# Patient Record
Sex: FEMALE | Race: WHITE | NOT HISPANIC OR LATINO | Employment: OTHER | URBAN - METROPOLITAN AREA
[De-identification: names, ages, dates, MRNs, and addresses within clinical notes are randomized per-mention and may not be internally consistent; named-entity substitution may affect disease eponyms.]

---

## 2017-01-16 ENCOUNTER — MYC MEDICAL ADVICE (OUTPATIENT)
Dept: FAMILY MEDICINE | Facility: CLINIC | Age: 57
End: 2017-01-16

## 2017-01-16 DIAGNOSIS — K59.04 CHRONIC IDIOPATHIC CONSTIPATION: Primary | ICD-10-CM

## 2017-01-16 RX ORDER — LUBIPROSTONE 24 UG/1
24 CAPSULE ORAL 2 TIMES DAILY WITH MEALS
Qty: 60 CAPSULE | Refills: 3 | Status: SHIPPED | OUTPATIENT
Start: 2017-01-16 | End: 2017-02-13

## 2017-01-16 NOTE — TELEPHONE ENCOUNTER
Pt requesting new script to try Lubiprostone, was written 8/2/16 and discontinued.  See below note.  KpavelRN

## 2017-02-13 ENCOUNTER — MYC MEDICAL ADVICE (OUTPATIENT)
Dept: FAMILY MEDICINE | Facility: CLINIC | Age: 57
End: 2017-02-13

## 2017-02-13 DIAGNOSIS — K59.04 CHRONIC IDIOPATHIC CONSTIPATION: ICD-10-CM

## 2017-02-13 RX ORDER — LUBIPROSTONE 24 UG/1
24 CAPSULE ORAL 2 TIMES DAILY WITH MEALS
Qty: 180 CAPSULE | Refills: 3 | Status: SHIPPED | OUTPATIENT
Start: 2017-02-13 | End: 2020-09-14

## 2017-04-07 ENCOUNTER — MYC MEDICAL ADVICE (OUTPATIENT)
Dept: FAMILY MEDICINE | Facility: CLINIC | Age: 57
End: 2017-04-07

## 2017-04-07 ENCOUNTER — OFFICE VISIT (OUTPATIENT)
Dept: FAMILY MEDICINE | Facility: CLINIC | Age: 57
End: 2017-04-07
Payer: COMMERCIAL

## 2017-04-07 VITALS
WEIGHT: 188 LBS | TEMPERATURE: 98 F | HEART RATE: 102 BPM | SYSTOLIC BLOOD PRESSURE: 128 MMHG | BODY MASS INDEX: 30.22 KG/M2 | DIASTOLIC BLOOD PRESSURE: 70 MMHG | RESPIRATION RATE: 16 BRPM | HEIGHT: 66 IN

## 2017-04-07 DIAGNOSIS — R26.89 BALANCE PROBLEMS: ICD-10-CM

## 2017-04-07 DIAGNOSIS — R41.3 MEMORY DIFFICULTIES: Primary | ICD-10-CM

## 2017-04-07 DIAGNOSIS — D32.9 MENINGIOMA (H): ICD-10-CM

## 2017-04-07 PROCEDURE — 99214 OFFICE O/P EST MOD 30 MIN: CPT | Performed by: NURSE PRACTITIONER

## 2017-04-07 ASSESSMENT — ANXIETY QUESTIONNAIRES
5. BEING SO RESTLESS THAT IT IS HARD TO SIT STILL: NOT AT ALL
6. BECOMING EASILY ANNOYED OR IRRITABLE: NOT AT ALL
2. NOT BEING ABLE TO STOP OR CONTROL WORRYING: NOT AT ALL
1. FEELING NERVOUS, ANXIOUS, OR ON EDGE: NOT AT ALL
7. FEELING AFRAID AS IF SOMETHING AWFUL MIGHT HAPPEN: NOT AT ALL
3. WORRYING TOO MUCH ABOUT DIFFERENT THINGS: NOT AT ALL
GAD7 TOTAL SCORE: 0

## 2017-04-07 ASSESSMENT — PATIENT HEALTH QUESTIONNAIRE - PHQ9: 5. POOR APPETITE OR OVEREATING: NOT AT ALL

## 2017-04-07 NOTE — TELEPHONE ENCOUNTER
I have sent a referral to [facility] general neurology clinic, please call and see if we can get her down there sooner for evaluation, thanks.  Suzanne Stark, SARAHNP

## 2017-04-07 NOTE — NURSING NOTE
"Chief Complaint   Patient presents with     COGNITIVE ASSESSMENT     Medication Reconciliation     not on the gabapentin for 4 weeks, not on the estradiol for almost 1 year.       Initial BP (!) 135/94 (BP Location: Right arm, Patient Position: Chair, Cuff Size: Adult Regular)  Pulse 102  Temp 98  F (36.7  C) (Oral)  Resp 16  Ht 5' 6\" (1.676 m)  Wt 188 lb (85.3 kg)  BMI 30.34 kg/m2 Estimated body mass index is 30.34 kg/(m^2) as calculated from the following:    Height as of this encounter: 5' 6\" (1.676 m).    Weight as of this encounter: 188 lb (85.3 kg).  Medication Reconciliation: complete  "

## 2017-04-07 NOTE — PATIENT INSTRUCTIONS
Call to set up with Neurology for further evaluation.  Follow up if symptoms persist or worsen and as needed.        Thank you for choosing [facility].  You may be receiving a survey in the mail from [employer] regarding your visit today.  Please take a few minutes to complete and return the survey to let us know how we are doing.      Our Clinic hours are:  Mondays    7:20 am - 7 pm  Tues -  Fri  7:20 am - 5 pm    Clinic Phone: [phone]    The clinic lab opens at 7:30 am Mon - Fri and appointments are required.    [facility]. [phone]  Monday-Thursday 8 am - 7pm  Tues/Wed/Fri 8 am - 5:30 pm

## 2017-04-07 NOTE — PROGRESS NOTES
"  SUBJECTIVE:                                                    Arti Bui is a 56 year old female who presents to clinic today for the following health issues:      Pt is here with concerns of memory problems getting worse. She has had 3 concussions.         Problem list and histories reviewed & adjusted, as indicated.  Additional history: she states she's had issues with feeling off balance for \"years\" She fell about a year ago which she contributes to her poor balance. That hasn't seemed to be any worse, although she's concerned about her memory and her thinking. She states she was down to see her son and he audio recorded her asking the same questions over and over. She was unaware of that and though when she heard it he was playing a joke on her. Her  states she's more forgetful but the incident that really \"brought this to head\" and why she's here today is she missed a lunch date with her aunt. She went on the wrong day and she still feels confused as to how she could of gotten that mixed up.  Denies headaches, dizziness. No other focal complaint. States she's feeling physically the same. She reports having 4 past concussions, 3 of them caused LOC. First was at 6 years old. No history of seizures.  She tries to eat something every 2 yours, she checked her blood pressure and it's been stable.  She also states she had a meningoma diagnosed incidentally in 1996 after accident. She's never had any further work up on that.  She has a father with dementia from Parkinsons.        Patient Active Problem List   Diagnosis     Disease of jaw     HORMONAL REPLACEMT POSTMENOPAUSAL     Tobacco use disorder     CARDIOVASCULAR SCREENING; LDL GOAL LESS THAN 160     Chronic pain     Osteoarthritis     Arthralgia     Advanced directives, counseling/discussion     Hyperplastic colon polyp     Primary osteoarthritis involving multiple joints     Meningioma (H)     Past Surgical History:   Procedure Laterality Date     " HYSTERECTOMY, PAP NO LONGER INDICATED       SURGICAL HISTORY OF -   03/98    lap tubal ligation      SURGICAL HISTORY OF -   06/00    sinus surgery     SURGICAL HISTORY OF -       TM joint surgery      SURGICAL HISTORY OF -   2003    total vaginal hysterectomy unilateral oophorectomy     SURGICAL HISTORY OF -       salpingectomy - ectopic pregnancy      SURGICAL HISTORY OF -       tonsillectomy       Social History   Substance Use Topics     Smoking status: Former Smoker     Packs/day: 0.50     Years: 20.00     Types: Cigarettes     Quit date: 7/11/2013     Smokeless tobacco: Former User     Quit date: 6/11/2013     Alcohol use Yes      Comment: occ     Family History   Problem Relation Age of Onset     Blood Disease Mother      brain damage from encephalitis     DIABETES Father      diet controlled     Cardiovascular Father      stent     Parkinsonism Father      demetia form not shaky     DIABETES Brother      juvenille      DIABETES Brother      DIABETES Brother          Current Outpatient Prescriptions   Medication Sig Dispense Refill     triamterene-hydrochlorothiazide (DYAZIDE) 37.5-25 MG per capsule Take 1 capsule by mouth daily Labs DUE Dec 2016 90 capsule 0     lubiprostone (AMITIZA) 24 MCG capsule Take 1 capsule (24 mcg) by mouth 2 times daily (with meals) 180 capsule 3     EPINEPHrine (EPIPEN) 0.3 MG/0.3ML injection Inject 0.3 mLs (0.3 mg) into the muscle as needed for anaphylaxis .  Adrenaclick. 0.6 mL 1     EPINEPHrine (EPIPEN) 0.3 MG/0.3ML injection Inject 0.3 mLs (0.3 mg) into the muscle once as needed for anaphylaxis 1 each 2     cholecalciferol (VITAMIN D) 1000 UNIT tablet Take 1,000 Units by mouth daily  100 tablet 3     gabapentin (NEURONTIN) 300 MG capsule Take 3 capsules (900 mg) by mouth 3 times daily Increase by 1 tab weekly until improved or top dose of 900 mg tid (Patient not taking: Reported on 4/7/2017) 270 capsule 1     estradiol (ESTRACE) 0.5 MG tablet Take 1 tablet (0.5 mg) by mouth 2  "times daily Sublingually ( dissolve under the tongue ) (Patient not taking: Reported on 4/7/2017) 60 tablet 11     Naproxen Sodium (ALEVE PO) Take 660 mg by mouth once Reported on 4/7/2017       MULTIVITAMINS PO TABS Reported on 4/7/2017  3     Allergies   Allergen Reactions     Bee      Throat closes     Morphine Nausea and Vomiting       Reviewed and updated as needed this visit by clinical staff  Tobacco  Allergies  Med Hx  Surg Hx  Fam Hx  Soc Hx      Reviewed and updated as needed this visit by Provider          ROS: 10 point ROS neg other than the symptoms noted above in the HPI.    OBJECTIVE:                                                    /70  Pulse 102  Temp 98  F (36.7  C) (Oral)  Resp 16  Ht 5' 6\" (1.676 m)  Wt 188 lb (85.3 kg)  BMI 30.34 kg/m2  Body mass index is 30.34 kg/(m^2).  GENERAL: healthy, alert and no distress  HENT: ear canals and TM's normal, pharynx without erythema  NECK: no adenopathy, no asymmetry  RESP: lungs clear to auscultation - no rales, rhonchi or wheezes  CV: regular rate and rhythm, normal S1 S2, no S3 or S4, no murmur  ABDOMEN: soft, nontender, no hepatosplenomegaly, no masses and bowel sounds normal  MS: no gross musculoskeletal defects noted  NEURO: cranial nerves are grossly intact without focal finding, negative Romberg's, follows directions well, clear and intact speech, somewhat anxious    Diagnostic Test Results:  No results found for this or any previous visit (from the past 24 hour(s)).     ASSESSMENT/PLAN:                                                            1. Memory difficulties    - NEUROLOGY ADULT REFERRAL  Will send to neurology for work up on concerns.   She is aware to seek emergent care if any symptoms of great concern arise.    2. Balance problems    - NEUROLOGY ADULT REFERRAL    3. Meningioma (H)        See Patient Instructions  Patient Instructions   Call to set up with Neurology for further evaluation.  Follow up if symptoms persist " or worsen and as needed.        Thank you for choosing [facility].  You may be receiving a survey in the mail from [employer] regarding your visit today.  Please take a few minutes to complete and return the survey to let us know how we are doing.      Our Clinic hours are:  Mondays    7:20 am - 7 pm  Tues -  Fri  7:20 am - 5 pm    Clinic Phone: [phone]    The clinic lab opens at 7:30 am Mon - Fri and appointments are required.    [facility]  Ph. [phone]  Monday-Thursday 8 am - 7pm  Tues/Wed/Fri 8 am - 5:30 pm             [facility]

## 2017-04-07 NOTE — MR AVS SNAPSHOT
After Visit Summary   4/7/2017    Arti Bui    MRN: [id number]           Patient Information     Date Of Birth          1960        Visit Information        Provider Department      4/7/2017 8:40 AM Suzanne Stark APRN CNP [facility][note 3]        Today's Diagnoses     Memory difficulties    -  1    Balance problems        Meningioma (H)          Care Instructions    Call to set up with Neurology for further evaluation.  Follow up if symptoms persist or worsen and as needed.        Thank you for choosing [facility]  You may be receiving a survey in the mail from [employer] regarding your visit today.  Please take a few minutes to complete and return the survey to let us know how we are doing.      Our Clinic hours are:  Mondays    7:20 am - 7 pm  Tues -  Fri  7:20 am - 5 pm    Clinic Phone: [phone]    The clinic lab opens at 7:30 am Mon - Fri and appointments are required.    [facility]  Ph. [phone]  Monday-Thursday 8 am - 7pm  Tues/Wed/Fri 8 am - 5:30 pm               Follow-ups after your visit        Additional Services     NEUROLOGY ADULT REFERRAL       Your provider has referred you to: [facility]: [facility] [phone]   [email or web address]    Reason for Referral: Consult    Please be aware that coverage of these services is subject to the terms and limitations of your health insurance plan.  Call member services at your health plan with any benefit or coverage questions.      Please bring the following with you to your appointment:    (1) Any X-Rays, CTs or MRIs which have been performed.  Contact the facility where they were done to arrange for  prior to your scheduled appointment.    (2) List of current medications  (3) This referral request   (4) Any documents/labs given to you for this referral                  Who to contact     If you have questions or need follow up information  "about today's clinic visit or your schedule please contact [facility] directly at [phone].  Normal or non-critical lab and imaging results will be communicated to you by MyChart, letter or phone within 4 business days after the clinic has received the results. If you do not hear from us within 7 days, please contact the clinic through [employer] or phone. If you have a critical or abnormal lab result, we will notify you by phone as soon as possible.  Submit refill requests through [employer] or call your pharmacy and they will forward the refill request to us. Please allow 3 business days for your refill to be completed.          Additional Information About Your Visit        [employer] Information     [employer] gives you secure access to your electronic health record. If you see a primary care provider, you can also send messages to your care team and make appointments. If you have questions, please call your primary care clinic.  If you do not have a primary care provider, please call [phone] and they will assist you.        Care EveryWhere ID     This is your Care EveryWhere ID. This could be used by other organizations to access your [place] medical records  [id number]        Your Vitals Were     Pulse Temperature Respirations Height BMI (Body Mass Index)       102 98  F (36.7  C) (Oral) 16 5' 6\" (1.676 m) 30.34 kg/m2        Blood Pressure from Last 3 Encounters:   04/07/17 128/70   08/17/16 122/74   08/15/16 140/67    Weight from Last 3 Encounters:   04/07/17 188 lb (85.3 kg)   08/17/16 191 lb (86.6 kg)   08/15/16 185 lb (83.9 kg)              We Performed the Following     NEUROLOGY ADULT REFERRAL        Primary Care Provider Office Phone # Fax #    Elio Shetty [phone]       [facility] [address] [facility] [address]        Thank you!     Thank you for choosing [facility]  for your care. Our goal is always to provide you with " excellent care. Hearing back from our patients is one way we can continue to improve our services. Please take a few minutes to complete the written survey that you may receive in the mail after your visit with us. Thank you!             Your Updated Medication List - Protect others around you: Learn how to safely use, store and throw away your medicines at www.disposemymeds.org.          This list is accurate as of: 4/7/17  9:06 AM.  Always use your most recent med list.                   Brand Name Dispense Instructions for use    ALEVE PO      Take 660 mg by mouth once Reported on 4/7/2017       cholecalciferol 1000 UNIT tablet    vitamin D    100 tablet    Take 1,000 Units by mouth daily       * EPINEPHrine 0.3 MG/0.3ML injection     1 each    Inject 0.3 mLs (0.3 mg) into the muscle once as needed for anaphylaxis       * EPINEPHrine 0.3 MG/0.3ML injection     0.6 mL    Inject 0.3 mLs (0.3 mg) into the muscle as needed for anaphylaxis .  Adrenaclick.       estradiol 0.5 MG tablet    ESTRACE    60 tablet    Take 1 tablet (0.5 mg) by mouth 2 times daily Sublingually ( dissolve under the tongue )       gabapentin 300 MG capsule    NEURONTIN    270 capsule    Take 3 capsules (900 mg) by mouth 3 times daily Increase by 1 tab weekly until improved or top dose of 900 mg tid       lubiprostone 24 MCG capsule    AMITIZA    180 capsule    Take 1 capsule (24 mcg) by mouth 2 times daily (with meals)       multivitamin per tablet      Reported on 4/7/2017       triamterene-hydrochlorothiazide 37.5-25 MG per capsule    DYAZIDE    90 capsule    Take 1 capsule by mouth daily Labs DUE Dec 2016       * Notice:  This list has 2 medication(s) that are the same as other medications prescribed for you. Read the directions carefully, and ask your doctor or other care provider to review them with you.

## 2017-04-07 NOTE — TELEPHONE ENCOUNTER
Left message for pt to return call to clinic, Pt could be seen as early as May 2nd at the [facility]. Number for her to call and schedule is [phone]

## 2017-04-08 ASSESSMENT — PATIENT HEALTH QUESTIONNAIRE - PHQ9: SUM OF ALL RESPONSES TO PHQ QUESTIONS 1-9: 9

## 2017-04-08 ASSESSMENT — ANXIETY QUESTIONNAIRES: GAD7 TOTAL SCORE: 0

## 2017-05-03 ENCOUNTER — OFFICE VISIT (OUTPATIENT)
Dept: NEUROLOGY | Facility: CLINIC | Age: 57
End: 2017-05-03

## 2017-05-03 VITALS — DIASTOLIC BLOOD PRESSURE: 95 MMHG | HEART RATE: 74 BPM | SYSTOLIC BLOOD PRESSURE: 130 MMHG

## 2017-05-03 DIAGNOSIS — R41.3 MEMORY LOSS: Primary | ICD-10-CM

## 2017-05-03 ASSESSMENT — PAIN SCALES - GENERAL: PAINLEVEL: NO PAIN (0)

## 2017-05-03 NOTE — NURSING NOTE
Chief Complaint   Patient presents with     Consult     confusion, memory issues      Cuba Rasheed,CMA

## 2017-05-03 NOTE — MR AVS SNAPSHOT
After Visit Summary   5/3/2017    Arti Bui    MRN: [id number]           Patient Information     Date Of Birth          1960        Visit Information        Provider Department      5/3/2017 8:30 AM Bahman Avila MD [facility]        Today's Diagnoses     Memory loss    -  1       Follow-ups after your visit        Follow-up notes from your care team     Return in about 6 weeks (around 6/14/2017).      Future tests that were ordered for you today     Open Future Orders        Priority Expected Expires Ordered    Methylmalonic acid Routine 5/8/2017 5/3/2018 5/3/2017    TSH Routine 5/8/2017 5/3/2018 5/3/2017    Vitamin B12 Routine 5/8/2017 5/3/2018 5/3/2017    Neuropsychological testing Routine 5/3/2017 5/3/2018 5/3/2017    MR Brain w/o & w Contrast Routine 5/3/2017 5/3/2018 5/3/2017            Who to contact     Please call your clinic at [phone] to:    Ask questions about your health    Make or cancel appointments    Discuss your medicines    Learn about your test results    Speak to your doctor   If you have compliments or concerns about an experience at your clinic, or if you wish to file a complaint, please contact [facility] Patient Relations at [phone] or email us at [email or web address]         Additional Information About Your Visit        MyChart Information     [employer] gives you secure access to your electronic health record. If you see a primary care provider, you can also send messages to your care team and make appointments. If you have questions, please call your primary care clinic.  If you do not have a primary care provider, please call [phone] and they will assist you.      [employer] is an electronic gateway that provides easy, online access to your medical records. With [employer], you can request a clinic appointment, read your test results, renew a prescription or  communicate with your care team.     To access your existing account, please contact your [facility] or call [phone] for assistance.        Care EveryWhere ID     This is your Care EveryWhere ID. This could be used by other organizations to access your [place] medical records  [id number]        Your Vitals Were     Pulse                   74            Blood Pressure from Last 3 Encounters:   05/03/17 (!) 130/95   04/07/17 128/70   08/17/16 122/74    Weight from Last 3 Encounters:   04/07/17 188 lb (85.3 kg)   08/17/16 191 lb (86.6 kg)   08/15/16 185 lb (83.9 kg)               Primary Care Provider Office Phone # Fax #    Elio Shetty [phone]       [facility] [address] [facility] [address]        Thank you!     Thank you for choosing [facility]  for your care. Our goal is always to provide you with excellent care. Hearing back from our patients is one way we can continue to improve our services. Please take a few minutes to complete the written survey that you may receive in the mail after your visit with us. Thank you!             Your Updated Medication List - Protect others around you: Learn how to safely use, store and throw away your medicines at www.disposemymeds.org.          This list is accurate as of: 5/3/17  9:56 AM.  Always use your most recent med list.                   Brand Name Dispense Instructions for use    ALEVE PO      Take 660 mg by mouth once Reported on 4/7/2017       cholecalciferol 1000 UNIT tablet    vitamin D    100 tablet    Take 1,000 Units by mouth daily       * EPINEPHrine 0.3 MG/0.3ML injection     1 each    Inject 0.3 mLs (0.3 mg) into the muscle once as needed for anaphylaxis       * EPINEPHrine 0.3 MG/0.3ML injection     0.6 mL    Inject 0.3 mLs (0.3 mg) into the muscle as needed for anaphylaxis .  Adrenaclick.       estradiol 0.5 MG tablet     ESTRACE    60 tablet    Take 1 tablet (0.5 mg) by mouth 2 times daily Sublingually ( dissolve under the tongue )       gabapentin 300 MG capsule    NEURONTIN    270 capsule    Take 3 capsules (900 mg) by mouth 3 times daily Increase by 1 tab weekly until improved or top dose of 900 mg tid       lubiprostone 24 MCG capsule    AMITIZA    180 capsule    Take 1 capsule (24 mcg) by mouth 2 times daily (with meals)       multivitamin per tablet      Reported on 4/7/2017       triamterene-hydrochlorothiazide 37.5-25 MG per capsule    DYAZIDE    90 capsule    Take 1 capsule by mouth daily Labs DUE Dec 2016       * Notice:  This list has 2 medication(s) that are the same as other medications prescribed for you. Read the directions carefully, and ask your doctor or other care provider to review them with you.
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
## 2017-05-04 ENCOUNTER — HOSPITAL ENCOUNTER (OUTPATIENT)
Dept: MRI IMAGING | Facility: CLINIC | Age: 57
Discharge: HOME | End: 2017-05-04
Attending: PSYCHIATRY & NEUROLOGY | Admitting: PSYCHIATRY & NEUROLOGY
Payer: COMMERCIAL

## 2017-05-04 DIAGNOSIS — R41.3 MEMORY LOSS: ICD-10-CM

## 2017-05-04 LAB
TSH SERPL DL<=0.05 MIU/L-ACNC: 1.15 MU/L (ref 0.4–4)
VIT B12 SERPL-MCNC: 284 PG/ML (ref 193–986)

## 2017-05-04 PROCEDURE — 25500064 ZZH RX 255 OP 636: Performed by: PSYCHIATRY & NEUROLOGY

## 2017-05-04 PROCEDURE — 83921 ORGANIC ACID SINGLE QUANT: CPT | Mod: 90 | Performed by: PSYCHIATRY & NEUROLOGY

## 2017-05-04 PROCEDURE — 70553 MRI BRAIN STEM W/O & W/DYE: CPT

## 2017-05-04 PROCEDURE — A9585 GADOBUTROL INJECTION: HCPCS | Performed by: PSYCHIATRY & NEUROLOGY

## 2017-05-04 PROCEDURE — 99000 SPECIMEN HANDLING OFFICE-LAB: CPT | Performed by: PSYCHIATRY & NEUROLOGY

## 2017-05-04 PROCEDURE — 36415 COLL VENOUS BLD VENIPUNCTURE: CPT | Performed by: PSYCHIATRY & NEUROLOGY

## 2017-05-04 PROCEDURE — 84443 ASSAY THYROID STIM HORMONE: CPT | Performed by: PSYCHIATRY & NEUROLOGY

## 2017-05-04 PROCEDURE — 82607 VITAMIN B-12: CPT | Performed by: PSYCHIATRY & NEUROLOGY

## 2017-05-04 RX ORDER — GADOBUTROL 604.72 MG/ML
8 INJECTION INTRAVENOUS ONCE
Status: COMPLETED | OUTPATIENT
Start: 2017-05-04 | End: 2017-05-04

## 2017-05-04 RX ADMIN — GADOBUTROL 8 ML: 604.72 INJECTION INTRAVENOUS at 08:09

## 2017-05-05 LAB — METHYLMALONATE SERPL-SCNC: 0.25

## 2017-05-24 ENCOUNTER — OFFICE VISIT (OUTPATIENT)
Dept: NEUROPSYCHOLOGY | Facility: CLINIC | Age: 57
End: 2017-05-24

## 2017-05-24 DIAGNOSIS — D32.9 MENINGIOMA (H): ICD-10-CM

## 2017-05-24 DIAGNOSIS — F09 COGNITIVE DISORDER: Primary | ICD-10-CM

## 2017-05-24 NOTE — MR AVS SNAPSHOT
After Visit Summary   5/24/2017    Arti Bui    MRN: [id number]           Patient Information     Date Of Birth          1960        Visit Information        Provider Department      5/24/2017 8:30 AM [facility]        Today's Diagnoses     Cognitive disorder    -  1    Meningioma (H)           Follow-ups after your visit        Your next 10 appointments already scheduled     Jun 12, 2017 10:45 AM CDT   Return Visit with Bahman Avila MD   [facility] ([facility])    [address], [address]  [facility] [facility] [address] [phone]              Who to contact     Please call your clinic at [phone] to:    Ask questions about your health    Make or cancel appointments    Discuss your medicines    Learn about your test results    Speak to your doctor   If you have compliments or concerns about an experience at your clinic, or if you wish to file a complaint, please contact [facility] Patient Relations at [phone] or email us at [email or web address]         Additional Information About Your Visit        MyChart Information     [facility] gives you secure access to your electronic health record. If you see a primary care provider, you can also send messages to your care team and make appointments. If you have questions, please call your primary care clinic.  If you do not have a primary care provider, please call [phone] and they will assist you.      [facility] is an electronic gateway that provides easy, online access to your medical records. With [facility], you can request a clinic appointment, read your test results, renew a prescription or communicate with your care team.     To access your existing account, please contact your [facility] or call [phone] for assistance.        Care EveryWhere ID     This is your  Care EveryWhere ID. This could be used by other organizations to access your [facility] medical records  [id number]         Blood Pressure from Last 3 Encounters:   05/03/17 (!) 130/95   04/07/17 128/70   08/17/16 122/74    Weight from Last 3 Encounters:   04/07/17 85.3 kg (188 lb)   08/17/16 86.6 kg (191 lb)   08/15/16 83.9 kg (185 lb)              We Performed the Following     [id number] TESTING, PER HR/PSYCHOLOGIST     NEUROPSYCH TESTING BY [facility]        Primary Care Provider Office Phone # Fax #    Elio Shetty [phone]       [facility] [address] [address] [address]        Thank you!     Thank you for choosing [facility] NEUROPSYCHOLOGY  for your care. Our goal is always to provide you with excellent care. Hearing back from our patients is one way we can continue to improve our services. Please take a few minutes to complete the written survey that you may receive in the mail after your visit with us. Thank you!             Your Updated Medication List - Protect others around you: Learn how to safely use, store and throw away your medicines at www.disposemymeds.org.          This list is accurate as of: 5/24/17 11:59 PM.  Always use your most recent med list.                   Brand Name Dispense Instructions for use    ALEVE PO      Take 660 mg by mouth once Reported on 4/7/2017       cholecalciferol 1000 UNIT tablet    vitamin D    100 tablet    Take 1,000 Units by mouth daily       * EPINEPHrine 0.3 MG/0.3ML injection     1 each    Inject 0.3 mLs (0.3 mg) into the muscle once as needed for anaphylaxis       * EPINEPHrine 0.3 MG/0.3ML injection     0.6 mL    Inject 0.3 mLs (0.3 mg) into the muscle as needed for anaphylaxis .  Adrenaclick.       estradiol 0.5 MG tablet    ESTRACE    60 tablet    Take 1 tablet (0.5 mg) by mouth 2 times daily Sublingually ( dissolve under the tongue )       gabapentin 300 MG capsule    NEURONTIN    270 capsule    Take 3 capsules  (900 mg) by mouth 3 times daily Increase by 1 tab weekly until improved or top dose of 900 mg tid       lubiprostone 24 MCG capsule    AMITIZA    180 capsule    Take 1 capsule (24 mcg) by mouth 2 times daily (with meals)       multivitamin per tablet      Reported on 4/7/2017       triamterene-hydrochlorothiazide 37.5-25 MG per capsule    DYAZIDE    90 capsule    Take 1 capsule by mouth daily Labs DUE Dec 2016       * Notice:  This list has 2 medication(s) that are the same as other medications prescribed for you. Read the directions carefully, and ask your doctor or other care provider to review them with you.

## 2017-05-24 NOTE — PROGRESS NOTES
The patient was seen for neuropsychological evaluation at the request of Bahman Avila for the purpose of diagnostic clarification and treatment planning.  Two hours of face to face testing were provided by this writer.  Please see Dr. Ashanti Spencer's report for a full interpretation of the findings.    Isaura Scott  Psychometrist
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
## 2017-06-09 DIAGNOSIS — I10 BENIGN HYPERTENSION: ICD-10-CM

## 2017-06-09 RX ORDER — TRIAMTERENE AND HYDROCHLOROTHIAZIDE 37.5; 25 MG/1; MG/1
CAPSULE ORAL
Qty: 90 CAPSULE | Refills: 0 | Status: SHIPPED | OUTPATIENT
Start: 2017-06-09 | End: 2017-07-31

## 2017-06-09 NOTE — TELEPHONE ENCOUNTER
Triamterene-HCTZ      Last Written Prescription Date: 03/06/17  Last Fill Quantity: 90, # refills: 0  Last Office Visit with G, P or [facility] prescribing provider: 04/07/17       Potassium   Date Value Ref Range Status   06/29/2015 3.9 3.4 - 5.3 mmol/L Final     Creatinine   Date Value Ref Range Status   06/29/2015 0.73 0.52 - 1.04 mg/dL Final     BP Readings from Last 3 Encounters:   05/03/17 (!) 130/95   04/07/17 128/70   08/17/16 122/74

## 2017-06-09 NOTE — LETTER
[facility]  [address] [facility] [address]  Phone: [phone]    June 9, 2017    Arti CELSA Biu                                                                                                                     [address] [facility] [address]            Dear Ms. Bui,    We are concerned about your health care.  We recently provided you with a medication refill.  Many medications require routine follow-up with your Doctor.      At this time we ask that: You make a blood draw appointment your clinic for routine labs for medication monitoring.       You are due to have a Basic metabolic panel drawn. This was last drawn 6-.    Your prescription: Has been refilled for 3 months so you may have time for the above noted follow-up. Please have lab work drawn prior to needing your next refill of medication.         Thank you,      Elio Shetty MD/ [facility]

## 2017-06-12 ENCOUNTER — OFFICE VISIT (OUTPATIENT)
Dept: NEUROLOGY | Facility: CLINIC | Age: 57
End: 2017-06-12

## 2017-06-12 DIAGNOSIS — R41.3 MEMORY LOSS: Primary | ICD-10-CM

## 2017-06-12 NOTE — PROGRESS NOTES
HISTORY OF PRESENT ILLNESS:  This patient is seen in followup with issues of memory and imbalance.  The EMR is currently unavailable, so details of her history and assessment are not available to me.  She did have neuropsych testing performed, although the report is not available.  I did check labs including a vitamin B12 level, and she is now on oral replacement.  MRI study was performed as well but that result is not available.  I did communicate the result to her.      ASSESSMENT:   1.  Multiple issues including memory and imbalance.      DISCUSSION:  This patient is seen in followup with multiple issues of impaired memory and imbalance.  Her neuropsych results did not show consistent abnormal findings across a range of tests, raising the possibility of a functional impairment.  Pt does not think she is depressed.  MRI showed no significant findings.  I have asked her to make a f/u appt in 3 months for re-examination.  In the meantime, she will stay on oral vitamin B12.      Joseph Avila MD      cc:   [place] [address]         JOSEPH AVILA MD             D: 2017 12:04   T: 2017 13:44   MT: SUSSY      Name:     JUANPABLO MAYFIELD   MRN:      [id number]        Account:      [id number]   :      1960           Service Date: 2017      Document: [id number]

## 2017-06-12 NOTE — LETTER
2017       RE: Arti Mayfield  [address]  [facility] [address]     Dear Colleague,    Thank you for referring your patient, Arti Mayfield, to the [facility] NEUROLOGY [facility]INIC at [facility]. Please see a copy of my visit note below.    HISTORY OF PRESENT ILLNESS:  This patient is seen in followup with issues of memory and imbalance.  The EMR is currently unavailable, so details of her history and assessment are not available to me.  She did have neuropsych testing performed, although the report is not available.  I did check labs including a vitamin B12 level, and she is now on oral replacement.  MRI study was performed as well but that result is not available.  I did communicate the result to her.      ASSESSMENT:   1.  Multiple issues including memory and imbalance.      DISCUSSION:  This patient is seen in followup with multiple issues of impaired memory and imbalance.  Her neuropsych results did not show consistent abnormal findings across a range of tests, raising the possibility of a functional impairment.  Pt does not think she is depressed.  MRI showed no significant findings.  I have asked her to make a f/u appt in 3 months for re-examination.  In the meantime, she will stay on oral vitamin B12.      Bahman Avila MD      cc:   Elio Shetty MD   [address] [address]              D: 2017 12:04   T: 2017 13:44   MT: SUSSY      Name:     ARTI MAYFIELD   MRN:      [id number]        Account:      [id number]   :      1960           Service Date: 2017      Document: [id number]

## 2017-06-12 NOTE — MR AVS SNAPSHOT
After Visit Summary   6/12/2017    Arti Bui    MRN: [id number]           Patient Information     Date Of Birth          1960        Visit Information        Provider Department      6/12/2017 11:00 AM Bahman Avila MD [facility]        Today's Diagnoses     Memory loss    -  1       Follow-ups after your visit        Follow-up notes from your care team     Return in about 3 months (around 9/12/2017).      Who to contact     Please call your clinic at [phone] to:    Ask questions about your health    Make or cancel appointments    Discuss your medicines    Learn about your test results    Speak to your doctor   If you have compliments or concerns about an experience at your clinic, or if you wish to file a complaint, please contact [facility] Patient Relations at [phone] or email us at [email or web address]         Additional Information About Your Visit        MyChart Information     [employer] gives you secure access to your electronic health record. If you see a primary care provider, you can also send messages to your care team and make appointments. If you have questions, please call your primary care clinic.  If you do not have a primary care provider, please call [phone] and they will assist you.      [employer] is an electronic gateway that provides easy, online access to your medical records. With [employer], you can request a clinic appointment, read your test results, renew a prescription or communicate with your care team.     To access your existing account, please contact your [facility] or call [phone] for assistance.        Care EveryWhere ID     This is your Care EveryWhere ID. This could be used by other organizations to access your [place] medical records  [id number]         Blood Pressure from Last 3 Encounters:   No data found for BP     Weight from Last 3 Encounters:   No data found for Wt              Today, you had the following     No orders found for display       Primary Care Provider Office Phone # Fax #    Elio Shetty [phone]       [facility] [address]  [facility] [address]        Thank you!     Thank you for choosing [facility] NEUROLOGY CLINIC  for your care. Our goal is always to provide you with excellent care. Hearing back from our patients is one way we can continue to improve our services. Please take a few minutes to complete the written survey that you may receive in the mail after your visit with us. Thank you!             Your Updated Medication List - Protect others around you: Learn how to safely use, store and throw away your medicines at www.disposemymeds.org.          This list is accurate as of: 6/12/17 11:59 PM.  Always use your most recent med list.                   Brand Name Dispense Instructions for use    ALEVE PO      Take 660 mg by mouth once Reported on 4/7/2017       cholecalciferol 1000 UNIT tablet    vitamin D    100 tablet    Take 1,000 Units by mouth daily       * EPINEPHrine 0.3 MG/0.3ML injection     1 each    Inject 0.3 mLs (0.3 mg) into the muscle once as needed for anaphylaxis       * EPINEPHrine 0.3 MG/0.3ML injection     0.6 mL    Inject 0.3 mLs (0.3 mg) into the muscle as needed for anaphylaxis .  Adrenaclick.       estradiol 0.5 MG tablet    ESTRACE    60 tablet    Take 1 tablet (0.5 mg) by mouth 2 times daily Sublingually ( dissolve under the tongue )       gabapentin 300 MG capsule    NEURONTIN    270 capsule    Take 3 capsules (900 mg) by mouth 3 times daily Increase by 1 tab weekly until improved or top dose of 900 mg tid       lubiprostone 24 MCG capsule    AMITIZA    180 capsule    Take 1 capsule (24 mcg) by mouth 2 times daily (with meals)       multivitamin per tablet      Reported on 4/7/2017        triamterene-hydrochlorothiazide 37.5-25 MG per capsule    DYAZIDE    90 capsule    TAKE ONE CAPSULE BY MOUTH EVERY DAY       * Notice:  This list has 2 medication(s) that are the same as other medications prescribed for you. Read the directions carefully, and ask your doctor or other care provider to review them with you.

## 2017-07-19 ENCOUNTER — MYC MEDICAL ADVICE (OUTPATIENT)
Dept: FAMILY MEDICINE | Facility: CLINIC | Age: 57
End: 2017-07-19

## 2017-07-19 NOTE — TELEPHONE ENCOUNTER
Spoke with pt and she will make an appt with  in about 2 weeks to discuss her appts, labs, tests. Rich

## 2017-07-31 ENCOUNTER — OFFICE VISIT (OUTPATIENT)
Dept: FAMILY MEDICINE | Facility: CLINIC | Age: 57
End: 2017-07-31
Payer: COMMERCIAL

## 2017-07-31 VITALS
DIASTOLIC BLOOD PRESSURE: 73 MMHG | SYSTOLIC BLOOD PRESSURE: 112 MMHG | TEMPERATURE: 98.3 F | BODY MASS INDEX: 31.18 KG/M2 | HEART RATE: 71 BPM | WEIGHT: 194 LBS | RESPIRATION RATE: 18 BRPM | HEIGHT: 66 IN

## 2017-07-31 DIAGNOSIS — F32.A DEPRESSION, UNSPECIFIED DEPRESSION TYPE: Primary | ICD-10-CM

## 2017-07-31 DIAGNOSIS — I10 BENIGN HYPERTENSION: ICD-10-CM

## 2017-07-31 LAB
ANION GAP SERPL CALCULATED.3IONS-SCNC: 5 MMOL/L (ref 3–14)
BUN SERPL-MCNC: 16 MG/DL (ref 7–30)
CALCIUM SERPL-MCNC: 9.6 MG/DL (ref 8.5–10.1)
CHLORIDE SERPL-SCNC: 101 MMOL/L (ref 94–109)
CO2 SERPL-SCNC: 30 MMOL/L (ref 20–32)
CREAT SERPL-MCNC: 0.69 MG/DL (ref 0.52–1.04)
GFR SERPL CREATININE-BSD FRML MDRD: 88 ML/MIN/1.7M2
GLUCOSE SERPL-MCNC: 90 MG/DL (ref 70–99)
POTASSIUM SERPL-SCNC: 3.4 MMOL/L (ref 3.4–5.3)
SODIUM SERPL-SCNC: 136 MMOL/L (ref 133–144)

## 2017-07-31 PROCEDURE — 99214 OFFICE O/P EST MOD 30 MIN: CPT | Performed by: FAMILY MEDICINE

## 2017-07-31 PROCEDURE — 36415 COLL VENOUS BLD VENIPUNCTURE: CPT | Performed by: FAMILY MEDICINE

## 2017-07-31 PROCEDURE — 80048 BASIC METABOLIC PNL TOTAL CA: CPT | Performed by: FAMILY MEDICINE

## 2017-07-31 RX ORDER — PAROXETINE 20 MG/1
20 TABLET, FILM COATED ORAL EVERY MORNING
Qty: 30 TABLET | Refills: 3 | Status: SHIPPED | OUTPATIENT
Start: 2017-07-31 | End: 2017-11-28

## 2017-07-31 RX ORDER — TRIAMTERENE AND HYDROCHLOROTHIAZIDE 37.5; 25 MG/1; MG/1
1 CAPSULE ORAL DAILY
Qty: 90 CAPSULE | Refills: 3 | Status: SHIPPED | OUTPATIENT
Start: 2017-07-31 | End: 2018-08-01

## 2017-07-31 RX ORDER — FLUOROURACIL 50 MG/G
CREAM TOPICAL 2 TIMES DAILY
COMMUNITY
End: 2018-08-01

## 2017-07-31 NOTE — MR AVS SNAPSHOT
After Visit Summary   7/31/2017    rAti Bui    MRN: [id number]           Patient Information     Date Of Birth          1960        Visit Information        Provider Department      7/31/2017 8:00 AM Elio Shetty MD [facility]        Today's Diagnoses     Depression, unspecified depression type    -  1    Benign hypertension          Care Instructions          Thank you for choosing [facility].  You may be receiving a survey in the mail from [facility] regarding your visit today.  Please take a few minutes to complete and return the survey to let us know how we are doing.      Our Clinic hours are:  Mondays    7:20 am - 7 pm  Tues -  Fri  7:20 am - 5 pm    Clinic Phone: [phone]    The clinic lab opens at 7:30 am Mon - Fri and appointments are required.    [facility]  Ph. [phone]  Monday-Thursday 8 am - 7pm  Tues/Wed/Fri 8 am - 5:30 pm                 Follow-ups after your visit        Who to contact     If you have questions or need follow up information about today's clinic visit or your schedule please contact [facility] directly at [phone].  Normal or non-critical lab and imaging results will be communicated to you by MyChart, letter or phone within 4 business days after the clinic has received the results. If you do not hear from us within 7 days, please contact the clinic through [employer] or phone. If you have a critical or abnormal lab result, we will notify you by phone as soon as possible.  Submit refill requests through [employer] or call your pharmacy and they will forward the refill request to us. Please allow 3 business days for your refill to be completed.          Additional Information About Your Visit        MyChart Information     [employer] gives you secure access to your electronic health record. If you see a primary care provider, you can also send messages to your care team and make appointments.  "If you have questions, please call your primary care clinic.  If you do not have a primary care provider, please call [phone] and they will assist you.        Care EveryWhere ID     This is your Care EveryWhere ID. This could be used by other organizations to access your [place] medical records  [id number]        Your Vitals Were     Pulse Temperature Respirations Height BMI (Body Mass Index)       71 98.3  F (36.8  C) 18 5' 6\" (1.676 m) 31.31 kg/m2        Blood Pressure from Last 3 Encounters:   07/31/17 112/73   05/03/17 (!) 130/95   04/07/17 128/70    Weight from Last 3 Encounters:   07/31/17 194 lb (88 kg)   04/07/17 188 lb (85.3 kg)   08/17/16 191 lb (86.6 kg)              We Performed the Following     Basic metabolic panel          Today's Medication Changes          These changes are accurate as of: 7/31/17  8:59 AM.  If you have any questions, ask your nurse or doctor.               Start taking these medicines.        Dose/Directions    PARoxetine 20 MG tablet   Commonly known as:  PAXIL   Used for:  Depression, unspecified depression type   Started by:  Elio Shetty MD        Dose:  20 mg   Take 1 tablet (20 mg) by mouth every morning   Quantity:  30 tablet   Refills:  3         These medicines have changed or have updated prescriptions.        Dose/Directions    triamterene-hydrochlorothiazide 37.5-25 MG per capsule   Commonly known as:  DYAZIDE   This may have changed:  See the new instructions.   Used for:  Benign hypertension   Changed by:  Elio Shetty MD        Dose:  1 capsule   Take 1 capsule by mouth daily   Quantity:  90 capsule   Refills:  3            Where to get your medicines      These medications were sent to [facility] [place], [place] - [address]  [address], [facility] [address]     Phone:  [phone]     PARoxetine 20 MG tablet    triamterene-hydrochlorothiazide 37.5-25 MG per capsule                Primary Care " Provider Office Phone # Fax #    Elio Shetty [phone]       [facility] [address] [facility] [address]        Equal Access to Services     HARJINDER PEDERSON : Hadii nate ku hadfaviolao Soyeyoali, waaxda luqadaha, qaybta kaalmada adeegyada, kina wallacen ebony evans laBaronchas trujillo. So [facility] [phone].    ATENCIÓN: Si habla español, tiene a palacio disposición servicios gratuitos de asistencia lingüística. Llame al [phone].    We comply with applicable federal civil rights laws and [place] laws. We do not discriminate on the basis of race, color, national origin, age, disability sex, sexual orientation or gender identity.            Thank you!     Thank you for choosing [facility]  for your care. Our goal is always to provide you with excellent care. Hearing back from our patients is one way we can continue to improve our services. Please take a few minutes to complete the written survey that you may receive in the mail after your visit with us. Thank you!             Your Updated Medication List - Protect others around you: Learn how to safely use, store and throw away your medicines at www.disposemymeds.org.          This list is accurate as of: 7/31/17  8:59 AM.  Always use your most recent med list.                   Brand Name Dispense Instructions for use Diagnosis    ALEVE PO      Take 660 mg by mouth once Reported on 4/7/2017        cholecalciferol 1000 UNIT tablet    vitamin D    100 tablet    Take 1,000 Units by mouth daily        * EPINEPHrine 0.3 MG/0.3ML injection 2-pack    EPIPEN/ADRENACLICK/or ANY BX GENERIC EQUIV    1 each    Inject 0.3 mLs (0.3 mg) into the muscle once as needed for anaphylaxis    Bee sting allergy       * EPINEPHrine 0.3 MG/0.3ML injection 2-pack    EPIPEN/ADRENACLICK/or ANY BX GENERIC EQUIV    0.6 mL    Inject 0.3 mLs (0.3 mg) into the muscle as needed for anaphylaxis .  Adrenaclick.    Bee sting reaction       estradiol 0.5  MG tablet    ESTRACE    60 tablet    Take 1 tablet (0.5 mg) by mouth 2 times daily Sublingually ( dissolve under the tongue )    Menopausal disorder       fluorouracil 5 % cream    EFUDEX     Apply topically 2 times daily        gabapentin 300 MG capsule    NEURONTIN    270 capsule    Take 3 capsules (900 mg) by mouth 3 times daily Increase by 1 tab weekly until improved or top dose of 900 mg tid    Fibromyalgia       lubiprostone 24 MCG capsule    AMITIZA    180 capsule    Take 1 capsule (24 mcg) by mouth 2 times daily (with meals)    Chronic idiopathic constipation       multivitamin per tablet      Reported on 4/7/2017        PARoxetine 20 MG tablet    PAXIL    30 tablet    Take 1 tablet (20 mg) by mouth every morning    Depression, unspecified depression type       triamterene-hydrochlorothiazide 37.5-25 MG per capsule    DYAZIDE    90 capsule    Take 1 capsule by mouth daily    Benign hypertension       * Notice:  This list has 2 medication(s) that are the same as other medications prescribed for you. Read the directions carefully, and ask your doctor or other care provider to review them with you.

## 2017-07-31 NOTE — NURSING NOTE
"Chief Complaint   Patient presents with     Results     patient comes into the clinic to discuss test results from neurologist        Initial /73  Pulse 71  Temp 98.3  F (36.8  C)  Resp 18  Ht 5' 6\" (1.676 m)  Wt 194 lb (88 kg)  BMI 31.31 kg/m2 Estimated body mass index is 31.31 kg/(m^2) as calculated from the following:    Height as of this encounter: 5' 6\" (1.676 m).    Weight as of this encounter: 194 lb (88 kg).  Medication Reconciliation: complete   Cesia Eli CMA      "

## 2017-07-31 NOTE — PROGRESS NOTES
SUBJECTIVE:                                                    Arti Bui is a 57 year old female who presents to clinic today for the following health issues:      Chief Complaint   Patient presents with     Results     patient comes into the clinic to discuss test results from neurologist              Problem list and histories reviewed & adjusted, as indicated.  Additional history: as documented        Reviewed and updated as needed this visit by clinical staff  Tobacco  Allergies  Meds  Med Hx  Surg Hx  Fam Hx  Soc Hx      Reviewed and updated as needed this visit by Provider      Further history obtained, clarified or corrected by physician:  Her workup with the neurologist is essentially negative for her symptoms of forgetfulness and some cognitive symptoms. The symptoms are variable but seemed to be present on a daily basis. She had negative MRI and negative neuropsych testing. There is a question of perhaps a component of depression though she does not have classic symptoms. She does deal with chronic pain because of her jaw and she has poor sleep because she has to sleep in a chair because of the jaw pain.    OBJECTIVE:  LUNGS: clear to auscultation, normal breath sounds  CV: RRR without murmur  ABD: BS+, soft, nontender, no masses, no hepatosplenomegaly  EXTREMITIES: without joint tenderness, swelling or erythema.  No muscle tenderness or abnormality.  SKIN: No rashes or abnormalities  NEURO:non focal exam    ASSESSMENT:     Depression, unspecified depression type  Benign hypertension    PLAN:  Trial of Paxil, she was warned of possible side effects  Return for worsening or persisting symptoms.    Orders Placed This Encounter     Basic metabolic panel     fluorouracil (EFUDEX) 5 % cream     PARoxetine (PAXIL) 20 MG tablet     triamterene-hydrochlorothiazide (DYAZIDE) 37.5-25 MG per capsule

## 2017-07-31 NOTE — PATIENT INSTRUCTIONS
Thank you for choosing [facility].  You may be receiving a survey in the mail from [facility] regarding your visit today.  Please take a few minutes to complete and return the survey to let us know how we are doing.      Our Clinic hours are:  Mondays    7:20 am - 7 pm  Tues -  Fri  7:20 am - 5 pm    Clinic Phone: [phone]    The clinic lab opens at 7:30 am Mon - Fri and appointments are required.    [facility]. [phone]  Monday-Thursday 8 am - 7pm  Tues/Wed/Fri 8 am - 5:30 pm

## 2017-08-01 ASSESSMENT — PATIENT HEALTH QUESTIONNAIRE - PHQ9: SUM OF ALL RESPONSES TO PHQ QUESTIONS 1-9: 9

## 2017-08-07 PROBLEM — I10 BENIGN HYPERTENSION: Status: ACTIVE | Noted: 2017-08-07

## 2017-10-11 DIAGNOSIS — T63.441A BEE STING REACTION: ICD-10-CM

## 2017-10-11 NOTE — TELEPHONE ENCOUNTER
Epipen      Last Written Prescription Date: 11/11/2017  Last Fill Quantity: 1,  # refills: 2   Last Office Visit with [facility] or [facility] prescribing provider: 07/31/2017      Sanjay DURHAM)

## 2017-10-16 RX ORDER — EPINEPHRINE 0.3 MG/.3ML
INJECTION SUBCUTANEOUS
Qty: 0.6 ML | Refills: 1 | Status: SHIPPED | OUTPATIENT
Start: 2017-10-16 | End: 2020-09-14

## 2017-11-28 DIAGNOSIS — F32.A DEPRESSION, UNSPECIFIED DEPRESSION TYPE: ICD-10-CM

## 2017-11-29 RX ORDER — PAROXETINE 20 MG/1
20 TABLET, FILM COATED ORAL EVERY MORNING
Qty: 30 TABLET | Refills: 3 | Status: SHIPPED | OUTPATIENT
Start: 2017-11-29 | End: 2018-01-10

## 2017-11-29 NOTE — TELEPHONE ENCOUNTER
**This refill requires provider completion and is not appropriate for RN review per RN refill guidelines.**  PH-Q9 needs to be less than 5 to approve medication on RN Refill protocol pt's score is 13.  Suzanne Nath RN

## 2018-01-10 ENCOUNTER — MYC MEDICAL ADVICE (OUTPATIENT)
Dept: FAMILY MEDICINE | Facility: CLINIC | Age: 58
End: 2018-01-10

## 2018-01-10 DIAGNOSIS — F32.A DEPRESSION, UNSPECIFIED DEPRESSION TYPE: ICD-10-CM

## 2018-01-12 RX ORDER — PAROXETINE 20 MG/1
20 TABLET, FILM COATED ORAL EVERY MORNING
Qty: 90 TABLET | Refills: 0 | Status: SHIPPED | OUTPATIENT
Start: 2018-01-12 | End: 2018-04-19

## 2018-04-19 DIAGNOSIS — F32.A DEPRESSION, UNSPECIFIED DEPRESSION TYPE: ICD-10-CM

## 2018-04-19 NOTE — TELEPHONE ENCOUNTER
"Requested Prescriptions   Pending Prescriptions Disp Refills     PARoxetine (PAXIL) 20 MG tablet [Pharmacy Med Name: PAROXETINE HCL 20MG TABS]  Last Written Prescription Date:  01/12/2018  Last Fill Quantity: 90,  # refills: 0   Last office visit: 7/31/2017 with prescribing provider:  Diogenes   Future Office Visit:     90 tablet 0     Sig: TAKE ONE TABLET BY MOUTH EVERY MORNING    SSRIs Protocol Failed    4/19/2018  8:15 AM       Failed - PHQ-9 score less than 5 in past 6 months    Please review last PHQ-9 score.   PHQ-9 SCORE 4/7/2017 7/31/2017 8/28/2017   Total Score - - -   Total Score MyChart - - 13 (Moderate depression)   Total Score 9 9 13     JONNY-7 SCORE 12/17/2013 4/7/2017   Total Score 0 -   Total Score - 0              Failed - Recent (6 mo) or future (30 days) visit within the authorizing provider's specialty    Patient had office visit in the last 6 months or has a visit in the next 30 days with authorizing provider or within the authorizing provider's specialty.  See \"Patient Info\" tab in inbasket, or \"Choose Columns\" in Meds & Orders section of the refill encounter.           Passed - Patient is age 18 or older       Passed - No active pregnancy on record       Passed - No positive pregnancy test in last 12 months        Sanjay SIDDIQI (R)    "

## 2018-04-20 RX ORDER — PAROXETINE 20 MG/1
TABLET, FILM COATED ORAL
Qty: 90 TABLET | Refills: 0 | Status: SHIPPED | OUTPATIENT
Start: 2018-04-20 | End: 2018-08-01

## 2018-06-16 ENCOUNTER — TELEPHONE (OUTPATIENT)
Dept: FAMILY MEDICINE | Facility: CLINIC | Age: 58
End: 2018-06-16

## 2018-06-16 NOTE — TELEPHONE ENCOUNTER
6/16/2018    Call Regarding Preventive Health Screening Mammogram       Attempt 1    Message on voicemail     Comments:       Outreach   Catarina Cruz

## 2018-07-23 DIAGNOSIS — F32.A DEPRESSION, UNSPECIFIED DEPRESSION TYPE: ICD-10-CM

## 2018-07-23 NOTE — TELEPHONE ENCOUNTER
"Requested Prescriptions   Pending Prescriptions Disp Refills     PARoxetine (PAXIL) 20 MG tablet [Pharmacy Med Name: PAROXETINE HCL 20MG TABS]  Last Written Prescription Date:  04/20/18  Last Fill Quantity: 90,  # refills: 0   Last office visit: 7/31/2017 with prescribing provider:  07/31/17   Future Office Visit:     90 tablet 0     Sig: TAKE ONE TABLET BY MOUTH EVERY MORNING    SSRIs Protocol Failed    7/23/2018  7:28 AM       Failed - PHQ-9 score less than 5 in past 6 months    Please review last PHQ-9 score.   PHQ-9 SCORE 4/7/2017 7/31/2017 8/28/2017   Total Score - - -   Total Score MyChart - - 13 (Moderate depression)   Total Score 9 9 13          Failed - Recent (6 mo) or future (30 days) visit within the authorizing provider's specialty    Patient had office visit in the last 6 months or has a visit in the next 30 days with authorizing provider or within the authorizing provider's specialty.  See \"Patient Info\" tab in inbasket, or \"Choose Columns\" in Meds & Orders section of the refill encounter.           Passed - Patient is age 18 or older       Passed - No active pregnancy on record       Passed - No positive pregnancy test in last 12 months          "

## 2018-07-25 RX ORDER — PAROXETINE 20 MG/1
TABLET, FILM COATED ORAL
Qty: 90 TABLET | Refills: 0 | OUTPATIENT
Start: 2018-07-25

## 2018-08-01 ENCOUNTER — OFFICE VISIT (OUTPATIENT)
Dept: FAMILY MEDICINE | Facility: CLINIC | Age: 58
End: 2018-08-01
Payer: COMMERCIAL

## 2018-08-01 ENCOUNTER — TELEPHONE (OUTPATIENT)
Dept: FAMILY MEDICINE | Facility: CLINIC | Age: 58
End: 2018-08-01

## 2018-08-01 VITALS
SYSTOLIC BLOOD PRESSURE: 102 MMHG | HEART RATE: 62 BPM | HEIGHT: 66 IN | BODY MASS INDEX: 30.53 KG/M2 | TEMPERATURE: 98.3 F | RESPIRATION RATE: 18 BRPM | OXYGEN SATURATION: 97 % | WEIGHT: 190 LBS | DIASTOLIC BLOOD PRESSURE: 60 MMHG

## 2018-08-01 DIAGNOSIS — Z91.030 BEE STING ALLERGY: ICD-10-CM

## 2018-08-01 DIAGNOSIS — F32.A DEPRESSION, UNSPECIFIED DEPRESSION TYPE: ICD-10-CM

## 2018-08-01 DIAGNOSIS — R60.9 EDEMA, UNSPECIFIED TYPE: Primary | ICD-10-CM

## 2018-08-01 LAB
ANION GAP SERPL CALCULATED.3IONS-SCNC: 6 MMOL/L (ref 3–14)
BUN SERPL-MCNC: 19 MG/DL (ref 7–30)
CALCIUM SERPL-MCNC: 9 MG/DL (ref 8.5–10.1)
CHLORIDE SERPL-SCNC: 103 MMOL/L (ref 94–109)
CO2 SERPL-SCNC: 27 MMOL/L (ref 20–32)
CREAT SERPL-MCNC: 0.82 MG/DL (ref 0.52–1.04)
GFR SERPL CREATININE-BSD FRML MDRD: 72 ML/MIN/1.7M2
GLUCOSE SERPL-MCNC: 90 MG/DL (ref 70–99)
POTASSIUM SERPL-SCNC: 3.9 MMOL/L (ref 3.4–5.3)
SODIUM SERPL-SCNC: 136 MMOL/L (ref 133–144)

## 2018-08-01 PROCEDURE — 36415 COLL VENOUS BLD VENIPUNCTURE: CPT | Performed by: FAMILY MEDICINE

## 2018-08-01 PROCEDURE — 80048 BASIC METABOLIC PNL TOTAL CA: CPT | Performed by: FAMILY MEDICINE

## 2018-08-01 PROCEDURE — 99213 OFFICE O/P EST LOW 20 MIN: CPT | Performed by: FAMILY MEDICINE

## 2018-08-01 RX ORDER — TRIAMTERENE AND HYDROCHLOROTHIAZIDE 37.5; 25 MG/1; MG/1
1 CAPSULE ORAL DAILY
Qty: 90 CAPSULE | Refills: 3 | Status: SHIPPED | OUTPATIENT
Start: 2018-08-01 | End: 2019-08-16

## 2018-08-01 RX ORDER — PAROXETINE 20 MG/1
20 TABLET, FILM COATED ORAL AT BEDTIME
Qty: 90 TABLET | Refills: 3 | Status: SHIPPED | OUTPATIENT
Start: 2018-08-01 | End: 2019-07-11

## 2018-08-01 RX ORDER — EPINEPHRINE 0.3 MG/.3ML
0.3 INJECTION SUBCUTANEOUS
Qty: 0.3 ML | Refills: 3 | Status: SHIPPED | OUTPATIENT
Start: 2018-08-01 | End: 2019-08-16

## 2018-08-01 ASSESSMENT — ANXIETY QUESTIONNAIRES
5. BEING SO RESTLESS THAT IT IS HARD TO SIT STILL: NOT AT ALL
3. WORRYING TOO MUCH ABOUT DIFFERENT THINGS: NOT AT ALL
6. BECOMING EASILY ANNOYED OR IRRITABLE: NOT AT ALL
2. NOT BEING ABLE TO STOP OR CONTROL WORRYING: NOT AT ALL
7. FEELING AFRAID AS IF SOMETHING AWFUL MIGHT HAPPEN: NOT AT ALL
1. FEELING NERVOUS, ANXIOUS, OR ON EDGE: NOT AT ALL
GAD7 TOTAL SCORE: 0
IF YOU CHECKED OFF ANY PROBLEMS ON THIS QUESTIONNAIRE, HOW DIFFICULT HAVE THESE PROBLEMS MADE IT FOR YOU TO DO YOUR WORK, TAKE CARE OF THINGS AT HOME, OR GET ALONG WITH OTHER PEOPLE: NOT DIFFICULT AT ALL

## 2018-08-01 ASSESSMENT — PATIENT HEALTH QUESTIONNAIRE - PHQ9: 5. POOR APPETITE OR OVEREATING: NOT AT ALL

## 2018-08-01 NOTE — PROGRESS NOTES
"  SUBJECTIVE:   Arti Bui is a 58 year old female who presents to clinic today for the following health issues:      Depression and Anxiety Follow-Up    Status since last visit: Improved     Other associated symptoms:None    Complicating factors:     Significant life event: No     Current substance abuse: None    PHQ-9 7/31/2017 8/28/2017 8/1/2018   Total Score 9 13 3   Q9: Suicide Ideation Not at all Not at all Not at all     JONNY-7 SCORE 12/17/2013 4/7/2017 8/1/2018   Total Score 0 - -   Total Score - 0 0       PHQ-9  English  PHQ-9   Any Language  JONNY-7  Suicide Assessment Five-step Evaluation and Treatment (SAFE-T)    Amount of exercise or physical activity: 6-7 days/week for an average of 30-45 minutes    Problems taking medications regularly: No    Medication side effects: none    Diet: regular (no restrictions)            Problem list and histories reviewed & adjusted, as indicated.  Additional history:         Reviewed and updated as needed this visit by clinical staff  Tobacco  Allergies  Meds  Med Hx  Surg Hx  Fam Hx  Soc Hx      Reviewed and updated as needed this visit by Provider      Further history obtained, clarified or corrected by physician:  She is here for follow-up of medications.  She is doing very well with no complaints.  She uses her hydrochlorothiazide for peripheral edema particularly on hot days that can be a problem.  Her memory is much better than it was a year ago when she was started on Paxil so she wants to continue with that.    OBJECTIVE:  /60  Pulse 62  Temp 98.3  F (36.8  C)  Resp 18  Ht 5' 6\" (1.676 m)  Wt 190 lb (86.2 kg)  SpO2 97%  BMI 30.67 kg/m2  LUNGS: clear to auscultation, normal breath sounds  CV: RRR without murmur  ABD: BS+, soft, nontender, no masses, no hepatosplenomegaly    ASSESSMENT:     Bee sting allergy  Depression, unspecified depression type  Edema, unspecified type    PLAN:  Orders Placed This Encounter     Basic metabolic panel     " EPINEPHrine (EPIPEN/ADRENACLICK/OR ANY BX GENERIC EQUIV) 0.3 MG/0.3ML injection 2-pack     triamterene-hydrochlorothiazide (DYAZIDE) 37.5-25 MG per capsule     PARoxetine (PAXIL) 20 MG tablet

## 2018-08-01 NOTE — PATIENT INSTRUCTIONS
Thank you for choosing [facility].  You may be receiving a survey in the mail from [facility] regarding your visit today.  Please take a few minutes to complete and return the survey to let us know how we are doing.      Our Clinic hours are:  Mondays    7:20 am - 7 pm  Tues - Fri  7:20 am - 5 pm    Clinic Phone: [phone]    The clinic lab opens at 7:30 am Mon - Fri and appointments are required.    [facility]. [phone]  Monday  8 am - 7pm  Tues - Fri 8 am - 5:30 pm

## 2018-08-01 NOTE — MR AVS SNAPSHOT
After Visit Summary   8/1/2018    Arti Bui    MRN: [id number]           Patient Information     Date Of Birth          1960        Visit Information        Provider Department      8/1/2018 7:20 AM Elio Shetty MD [facility]        Today's Diagnoses     Edema, unspecified type    -  1    Bee sting allergy        Depression, unspecified depression type          Care Instructions          Thank you for choosing [facility].  You may be receiving a [facility] visit today.  Please take a few minutes to complete and return the survey to let us know how we are doing.      Our Clinic hours are:  Mondays    7:20 am - 7 pm  Tues -  Fri  7:20 am - 5 pm    Clinic Phone: [phone]    The clinic lab opens at 7:30 am Mon - Fri and appointments are required.    [facility]  Ph. [phone]  Monday  8 am - 7pm  Tues - Fri 8 am - 5:30 pm                 Follow-ups after your visit        Who to contact     If you have questions or need follow up information about today's clinic visit or your schedule please contact [facility] directly at [phone].  Normal or non-critical lab and imaging results will be communicated to you by MyChart, letter or phone within 4 business days after the clinic has received the results. If you do not hear from us within 7 days, please contact the clinic through MyChart or phone. If you have a critical or abnormal lab result, we will notify you by phone as soon as possible.  Submit refill requests through [employer] or call your pharmacy and they will forward the refill request to us. Please allow 3 business days for your refill to be completed.          Additional Information About Your Visit        MyChart Information     [employer] gives you secure access to your electronic health record. If you see a primary care provider, you can also send messages to your care team and  "make appointments. If you have questions, please call your primary care clinic.  If you do not have a primary care provider, please call [phone] and they will assist you.        Care EveryWhere ID     This is your Care EveryWhere ID. This could be used by other organizations to access your [place] medical records  [id number]        Your Vitals Were     Pulse Temperature Respirations Height Pulse Oximetry BMI (Body Mass Index)    62 98.3  F (36.8  C) 18 5' 6\" (1.676 m) 97% 30.67 kg/m2       Blood Pressure from Last 3 Encounters:   08/01/18 102/60   07/31/17 112/73   05/03/17 (!) 130/95    Weight from Last 3 Encounters:   08/01/18 190 lb (86.2 kg)   07/31/17 194 lb (88 kg)   04/07/17 188 lb (85.3 kg)              We Performed the Following     Basic metabolic panel          Today's Medication Changes          These changes are accurate as of 8/1/18  8:01 AM.  If you have any questions, ask your nurse or doctor.               These medicines have changed or have updated prescriptions.        Dose/Directions    PARoxetine 20 MG tablet   Commonly known as:  PAXIL   This may have changed:  See the new instructions.   Used for:  Depression, unspecified depression type   Changed by:  Elio Shetty MD        Dose:  20 mg   Take 1 tablet (20 mg) by mouth At Bedtime   Quantity:  90 tablet   Refills:  3         Stop taking these medicines if you haven't already. Please contact your care team if you have questions.     ALEVE PO   Stopped by:  Elio Shetty MD           estradiol 0.5 MG tablet   Commonly known as:  ESTRACE   Stopped by:  Elio Shetty MD           fluorouracil 5 % cream   Commonly known as:  EFUDEX   Stopped by:  Elio Shetty MD           gabapentin 300 MG capsule   Commonly known as:  NEURONTIN   Stopped by:  Elio Shetty MD                Where to get your medicines      These medications were sent to [facility] [place], [place] - [address] [facility]  [address] [address] [facility] [address]     Phone:  [phone]     EPINEPHrine 0.3 MG/0.3ML injection 2-pack    PARoxetine 20 MG tablet    triamterene-hydrochlorothiazide 37.5-25 MG per capsule                Primary Care Provider Office Phone # Fax #    Eliochanelle Shetty -[phone] [address]  [facility] [address]        Equal Access to Services     HARJINDER PEDERSON : Hadii aad ku hadasho Soomaali, waaxda luqadaha, qaybta kaalmada adeegyada, waxay idiin hayaan adeeg kharash la'fareedn . So [facility] [phone].    ATENCIÓN: Si habla español, tiene a palacio disposición servicios gratuitos de asistencia lingüística. [facility] [phone].    We comply with applicable federal civil rights laws and [place] laws. We do not discriminate on the basis of race, color, national origin, age, disability, sex, sexual orientation, or gender identity.            Thank you!     Thank you for choosing [facility]  for your care. Our goal is always to provide you with excellent care. Hearing back from our patients is one way we can continue to improve our services. Please take a few minutes to complete the written survey that you may receive in the mail after your visit with us. Thank you!             Your Updated Medication List - Protect others around you: Learn how to safely use, store and throw away your medicines at www.disposemymeds.org.          This list is accurate as of 8/1/18  8:01 AM.  Always use your most recent med list.                   Brand Name Dispense Instructions for use Diagnosis    cholecalciferol 1000 UNIT tablet    vitamin D3    100 tablet    Take 1,000 Units by mouth daily        * EPINEPHrine 0.3 MG/0.3ML injection 2-pack    EPIPEN/ADRENACLICK/or ANY BX GENERIC EQUIV    0.6 mL    INJECT 0.3MLS INTO THE MUSCLE AS NEEDED FOR ALLERGIC REACTION    Bee sting reaction       * EPINEPHrine 0.3 MG/0.3ML injection 2-pack    EPIPEN/ADRENACLICK/or ANY BX GENERIC EQUIV    0.3 mL     Inject 0.3 mLs (0.3 mg) into the muscle once as needed for anaphylaxis    Bee sting allergy       lubiprostone 24 MCG capsule    AMITIZA    180 capsule    Take 1 capsule (24 mcg) by mouth 2 times daily (with meals)    Chronic idiopathic constipation       multivitamin per tablet      Reported on 4/7/2017        PARoxetine 20 MG tablet    PAXIL    90 tablet    Take 1 tablet (20 mg) by mouth At Bedtime    Depression, unspecified depression type       triamterene-hydrochlorothiazide 37.5-25 MG per capsule    DYAZIDE    90 capsule    Take 1 capsule by mouth daily        * Notice:  This list has 2 medication(s) that are the same as other medications prescribed for you. Read the directions carefully, and ask your doctor or other care provider to review them with you.

## 2018-08-01 NOTE — TELEPHONE ENCOUNTER
Panel Management Review      Patient has the following on her problem list: None      Composite cancer screening  Chart review shows that this patient is due/due soon for the following Mammogram  Summary:    Patient is due/failing the following:   MAMMOGRAM    Action needed:   mammo    Type of outreach:    Sent letter.    Questions for provider review:    None                                                                                                                                    Cesia Eli CMA       Chart routed to  none .

## 2018-08-02 ASSESSMENT — PATIENT HEALTH QUESTIONNAIRE - PHQ9: SUM OF ALL RESPONSES TO PHQ QUESTIONS 1-9: 3

## 2018-08-02 ASSESSMENT — ANXIETY QUESTIONNAIRES: GAD7 TOTAL SCORE: 0

## 2018-09-25 ENCOUNTER — MYC MEDICAL ADVICE (OUTPATIENT)
Dept: FAMILY MEDICINE | Facility: CLINIC | Age: 58
End: 2018-09-25

## 2019-04-29 ENCOUNTER — TELEPHONE (OUTPATIENT)
Dept: FAMILY MEDICINE | Facility: CLINIC | Age: 59
End: 2019-04-29

## 2019-04-29 NOTE — TELEPHONE ENCOUNTER
Panel Management Review      Patient has the following on her problem list:     Hypertension   Last three blood pressure readings:  BP Readings from Last 3 Encounters:   08/01/18 102/60   07/31/17 112/73   05/03/17 (!) 130/95     Blood pressure: Passed    HTN Guidelines:  Less than 140/90      Composite cancer screening  Chart review shows that this patient is due/due soon for the following Mammogram  Summary:    Patient is due/failing the following:   MAMMOGRAM    Action needed:   Patient needs referral/order: mammogram    Type of outreach:    Phone, left message for patient to call back.     Questions for provider review:    None                                                                                                                                    Anabela Peralta MA       Chart routed to Care Team .

## 2019-07-02 ENCOUNTER — MYC MEDICAL ADVICE (OUTPATIENT)
Dept: FAMILY MEDICINE | Facility: CLINIC | Age: 59
End: 2019-07-02

## 2019-07-02 DIAGNOSIS — I10 BENIGN HYPERTENSION: Primary | ICD-10-CM

## 2019-07-11 ENCOUNTER — TELEPHONE (OUTPATIENT)
Dept: FAMILY MEDICINE | Facility: CLINIC | Age: 59
End: 2019-07-11

## 2019-07-11 DIAGNOSIS — I10 BENIGN HYPERTENSION: ICD-10-CM

## 2019-07-11 DIAGNOSIS — F32.A DEPRESSION, UNSPECIFIED DEPRESSION TYPE: ICD-10-CM

## 2019-07-11 LAB
ANION GAP SERPL CALCULATED.3IONS-SCNC: 6 MMOL/L (ref 3–14)
BUN SERPL-MCNC: 20 MG/DL (ref 7–30)
CALCIUM SERPL-MCNC: 9.7 MG/DL (ref 8.5–10.1)
CHLORIDE SERPL-SCNC: 99 MMOL/L (ref 94–109)
CO2 SERPL-SCNC: 26 MMOL/L (ref 20–32)
CREAT SERPL-MCNC: 0.79 MG/DL (ref 0.52–1.04)
GFR SERPL CREATININE-BSD FRML MDRD: 83 ML/MIN/{1.73_M2}
GLUCOSE SERPL-MCNC: 83 MG/DL (ref 70–99)
POTASSIUM SERPL-SCNC: 4.2 MMOL/L (ref 3.4–5.3)
SODIUM SERPL-SCNC: 131 MMOL/L (ref 133–144)

## 2019-07-11 PROCEDURE — 80048 BASIC METABOLIC PNL TOTAL CA: CPT | Performed by: FAMILY MEDICINE

## 2019-07-11 PROCEDURE — 36415 COLL VENOUS BLD VENIPUNCTURE: CPT | Performed by: FAMILY MEDICINE

## 2019-07-11 RX ORDER — PAROXETINE 20 MG/1
20 TABLET, FILM COATED ORAL AT BEDTIME
Qty: 30 TABLET | Refills: 0 | Status: SHIPPED | OUTPATIENT
Start: 2019-07-11 | End: 2019-08-16

## 2019-07-11 NOTE — TELEPHONE ENCOUNTER
Medication is being filled for 1 time refill only due to:  Patient needs to be seen because due for appt..   She has appt.  Cristal KUMAR RN

## 2019-07-11 NOTE — TELEPHONE ENCOUNTER
Reason for Call:  Medication or medication refill:    Do you use a [facility]?  Name of the pharmacy and phone number for the current request:  [facility] [phone]    Name of the medication requested: Paxil - Pt asking for refill to get her through until she can get an appt for a CPE on 8/16.  No need to call patient back, unless there are questions or problems.      Paxil  Last Written Prescription Date:  8/1/18  Last Fill Quantity: 90,  # refills: 3   Last office visit: 8/1/2018 with prescribing provider:     Future Office Visit:   Next 5 appointments (look out 90 days)    Aug 16, 2019  9:20 AM CDT  PHYS[facility] Elio [facility]  [facility] [address] [facility] [address]  [phone]         Other request:     Can we leave a detailed message on this number? YES    Phone number patient can be reached at: Home number on file [phone] (home)    Best Time: any    Call taken on 7/11/2019 at 8:27 AM by Domonique Barboza

## 2019-08-14 ASSESSMENT — ENCOUNTER SYMPTOMS
COUGH: 0
DYSURIA: 0
FEVER: 0
NERVOUS/ANXIOUS: 0
MYALGIAS: 0
FREQUENCY: 1
HEMATOCHEZIA: 0
JOINT SWELLING: 1
HEARTBURN: 0
CONSTIPATION: 1
DIARRHEA: 0
NAUSEA: 0
EYE PAIN: 0
BREAST MASS: 0
SORE THROAT: 0
PARESTHESIAS: 0
CHILLS: 0
PALPITATIONS: 0
WEAKNESS: 0
SHORTNESS OF BREATH: 0
ARTHRALGIAS: 1
HEADACHES: 0
DIZZINESS: 1
ABDOMINAL PAIN: 0
HEMATURIA: 0

## 2019-08-16 ENCOUNTER — OFFICE VISIT (OUTPATIENT)
Dept: FAMILY MEDICINE | Facility: CLINIC | Age: 59
End: 2019-08-16
Payer: COMMERCIAL

## 2019-08-16 VITALS
OXYGEN SATURATION: 99 % | WEIGHT: 187 LBS | HEIGHT: 67 IN | BODY MASS INDEX: 29.35 KG/M2 | TEMPERATURE: 98.3 F | HEART RATE: 56 BPM | SYSTOLIC BLOOD PRESSURE: 118 MMHG | DIASTOLIC BLOOD PRESSURE: 68 MMHG | RESPIRATION RATE: 18 BRPM

## 2019-08-16 DIAGNOSIS — F32.A DEPRESSION, UNSPECIFIED DEPRESSION TYPE: ICD-10-CM

## 2019-08-16 DIAGNOSIS — I10 BENIGN HYPERTENSION: ICD-10-CM

## 2019-08-16 DIAGNOSIS — Z91.030 BEE STING ALLERGY: ICD-10-CM

## 2019-08-16 DIAGNOSIS — Z12.31 ENCOUNTER FOR SCREENING MAMMOGRAM FOR BREAST CANCER: Primary | ICD-10-CM

## 2019-08-16 DIAGNOSIS — Z00.00 ROUTINE GENERAL MEDICAL EXAMINATION AT A HEALTH CARE FACILITY: ICD-10-CM

## 2019-08-16 PROCEDURE — 99396 PREV VISIT EST AGE 40-64: CPT | Performed by: FAMILY MEDICINE

## 2019-08-16 RX ORDER — PAROXETINE 20 MG/1
20 TABLET, FILM COATED ORAL AT BEDTIME
Qty: 90 TABLET | Refills: 3 | Status: SHIPPED | OUTPATIENT
Start: 2019-08-16 | End: 2020-09-14

## 2019-08-16 RX ORDER — TRIAMTERENE AND HYDROCHLOROTHIAZIDE 37.5; 25 MG/1; MG/1
1 CAPSULE ORAL DAILY
Qty: 90 CAPSULE | Refills: 3 | Status: SHIPPED | OUTPATIENT
Start: 2019-08-16 | End: 2020-09-14

## 2019-08-16 RX ORDER — EPINEPHRINE 0.3 MG/.3ML
0.3 INJECTION SUBCUTANEOUS
Qty: 0.3 ML | Refills: 3 | Status: SHIPPED | OUTPATIENT
Start: 2019-08-16 | End: 2021-06-08

## 2019-08-16 ASSESSMENT — PATIENT HEALTH QUESTIONNAIRE - PHQ9
5. POOR APPETITE OR OVEREATING: NOT AT ALL
SUM OF ALL RESPONSES TO PHQ QUESTIONS 1-9: 0

## 2019-08-16 ASSESSMENT — ANXIETY QUESTIONNAIRES
1. FEELING NERVOUS, ANXIOUS, OR ON EDGE: NOT AT ALL
6. BECOMING EASILY ANNOYED OR IRRITABLE: NOT AT ALL
3. WORRYING TOO MUCH ABOUT DIFFERENT THINGS: NOT AT ALL
7. FEELING AFRAID AS IF SOMETHING AWFUL MIGHT HAPPEN: NOT AT ALL
GAD7 TOTAL SCORE: 0
2. NOT BEING ABLE TO STOP OR CONTROL WORRYING: NOT AT ALL
5. BEING SO RESTLESS THAT IT IS HARD TO SIT STILL: NOT AT ALL

## 2019-08-16 ASSESSMENT — MIFFLIN-ST. JEOR: SCORE: 1457.92

## 2019-08-16 NOTE — PROGRESS NOTES
SUBJECTIVE:   CC: Arti Bui is an 59 year old woman who presents for preventive health visit.     Healthy Habits:    Do you get at least three servings of calcium containing foods daily (dairy, green leafy vegetables, etc.)? yes    Amount of exercise or daily activities, outside of work: 3 day(s) per week    Problems taking medications regularly No    Medication side effects: No    Have you had an eye exam in the past two years? yes    Do you see a dentist twice per year? yes    Do you have sleep apnea, excessive snoring or daytime drowsiness?no      Hypertension Follow-up      Do you check your blood pressure regularly outside of the clinic? No     Are you following a low salt diet? Yes    Are your blood pressures ever more than 140 on the top number (systolic) OR more   than 90 on the bottom number (diastolic), for example 140/90? No  Depression and Anxiety Follow-Up    How are you doing with your depression since your last visit? No change    How are you doing with your anxiety since your last visit?  No change    Are you having other symptoms that might be associated with depression or anxiety? No    Have you had a significant life event? No     Do you have any concerns with your use of alcohol or other drugs? No    Social History     Tobacco Use     Smoking status: Former Smoker     Packs/day: 0.50     Years: 20.00     Pack years: 10.00     Types: Cigarettes     Last attempt to quit: 2013     Years since quittin.1     Smokeless tobacco: Former User     Quit date: 2013   Substance Use Topics     Alcohol use: Yes     Comment: occ     Drug use: No     PHQ 2017   PHQ-9 Total Score 13 3 0   Q9: Thoughts of better off dead/self-harm past 2 weeks Not at all Not at all Not at all     JONNY-7 SCORE 2017   Total Score - - -   Total Score 0 0 0     No flowsheet data found.  No flowsheet data found.      Suicide Assessment Five-step Evaluation and Treatment  (SAFE-T)    Today's PHQ-2 Score:   PHQ-2 (  [employer]) 2019   Q1: Little interest or pleasure in doing things 0 0   Q2: Feeling down, depressed or hopeless 0 0   PHQ-2 Score 0 0   Q1: Little interest or pleasure in doing things Not at all -   Q2: Feeling down, depressed or hopeless Not at all -   PHQ-2 Score 0 -       Abuse: Current or Past(Physical, Sexual or Emotional)- No  Do you feel safe in your environment? Yes    Social History     Tobacco Use     Smoking status: Former Smoker     Packs/day: 0.50     Years: 20.00     Pack years: 10.00     Types: Cigarettes     Last attempt to quit: 2013     Years since quittin.1     Smokeless tobacco: Former User     Quit date: 2013   Substance Use Topics     Alcohol use: Yes     Comment: occ     If you drink alcohol do you typically have >3 drinks per day or >7 drinks per week? No                     Reviewed orders with patient.  Reviewed health maintenance and updated orders accordingly -   Labs reviewed in [facility]    Mammogram Screening: Patient over age 50, mutual decision to screen reflected in health maintenance.    Pertinent mammograms are reviewed under the imaging tab.  History of abnormal Pap smear: Status post benign hysterectomy. Health Maintenance and Surgical History updated.  PAP / HPV 2007   PAP NIL     Reviewed and updated as needed this visit by clinical staff  Tobacco  Allergies         Reviewed and updated as needed this visit by Provider            ROS:  CONSTITUTIONAL: NEGATIVE for fever, chills, change in weight  INTEGUMENTARY/SKIN: NEGATIVE for worrisome rashes, moles or lesions  EYES: NEGATIVE for vision changes or irritation  ENT: NEGATIVE for ear, mouth and throat problems  RESP: NEGATIVE for significant cough or SOB  BREAST: NEGATIVE for masses, tenderness or discharge  CV: NEGATIVE for chest pain, palpitations or peripheral edema  GI: NEGATIVE for nausea, abdominal pain, heartburn, or change in bowel habits  :  "NEGATIVE for unusual urinary or vaginal symptoms. No vaginal bleeding.  MUSCULOSKELETAL: NEGATIVE for significant arthralgias or myalgia  NEURO: NEGATIVE for weakness, dizziness or paresthesias  PSYCHIATRIC: NEGATIVE for changes in mood or affect     OBJECTIVE:   /68   Pulse 56   Temp 98.3  F (36.8  C)   Resp 18   Ht 1.689 m (5' 6.5\")   Wt 84.8 kg (187 lb)   LMP 08/16/2003   SpO2 99%   BMI 29.73 kg/m    EXAM:  GENERAL APPEARANCE: healthy, alert and no distress  EYES: Eyes grossly normal to inspection, PERRL and conjunctivae and sclerae normal  HENT: ear canals and TM's normal, nose and mouth without ulcers or lesions, oropharynx clear and oral mucous membranes moist  NECK: no adenopathy, no asymmetry, masses, or scars and thyroid normal to palpation  RESP: lungs clear to auscultation - no rales, rhonchi or wheezes  BREAST: normal without masses, tenderness or nipple discharge and no palpable axillary masses or adenopathy  CV: regular rate and rhythm, normal S1 S2, no S3 or S4, no murmur, click or rub, no peripheral edema and peripheral pulses strong  ABDOMEN: soft, nontender, no hepatosplenomegaly, no masses and bowel sounds normal  MS: no musculoskeletal defects are noted and gait is age appropriate without ataxia  SKIN: no suspicious lesions or rashes  NEURO: Normal strength and tone, sensory exam grossly normal, mentation intact and speech normal  PSYCH: mentation appears normal and affect normal/bright    Diagnostic Test Results:  Labs reviewed in Epic    ASSESSMENT/PLAN:   Arti was seen today for physical.    Diagnoses and all orders for this visit:    Encounter for screening mammogram for breast cancer  -     *MA Screening Digital Bilateral; Future    Routine general medical examination at a health care facility    Depression, unspecified depression type  -     PARoxetine (PAXIL) 20 MG tablet; Take 1 tablet (20 mg) by mouth At Bedtime    Bee sting allergy  -     EPINEPHrine (EPIPEN/ADRENACLICK/OR " "ANY BX GENERIC EQUIV) 0.3 MG/0.3ML injection 2-pack; Inject 0.3 mLs (0.3 mg) into the muscle once as needed for anaphylaxis    Benign hypertension  -     triamterene-HCTZ (DYAZIDE) 37.5-25 MG capsule; Take 1 capsule by mouth daily    Other orders  -     DEPRESSION ACTION PLAN (DAP)        COUNSELING:   Reviewed preventive health counseling, as reflected in patient instructions       Regular exercise       Healthy diet/nutrition    Estimated body mass index is 29.73 kg/m  as calculated from the following:    Height as of this encounter: 1.689 m (5' 6.5\").    Weight as of this encounter: 84.8 kg (187 lb).         reports that she quit smoking about 6 years ago. Her smoking use included cigarettes. She has a 10.00 pack-year smoking history. She quit smokeless tobacco use about 6 years ago.      Counseling Resources:  ATP IV Guidelines  Pooled Cohorts Equation Calculator  Breast Cancer Risk Calculator  FRAX Risk Assessment  ICSI Preventive Guidelines  Dietary Guidelines for Americans, 2010  USDA's MyPlate  ASA Prophylaxis  Lung CA Screening    Elio Shetty MD  [facility]  "

## 2019-08-16 NOTE — LETTER
My Depression Action Plan  Name: Arti Bui   Date of Birth 1960  Date: 8/16/2019    My doctor: Elio Shetty   My clinic: [facility]  [address]  [facility] [address]  [phone]          GREEN    ZONE   Good Control    What it looks like:     Things are going generally well. You have normal up s and down s. You may even feel depressed from time to time, but bad moods usually last less than a day.   What you need to do:  1. Continue to care for yourself (see self care plan)  2. Check your depression survival kit and update it as needed  3. Follow your physician s recommendations including any medication.  4. Do not stop taking medication unless you consult with your physician first.           YELLOW         ZONE Getting Worse    What it looks like:     Depression is starting to interfere with your life.     It may be hard to get out of bed; you may be starting to isolate yourself from others.    Symptoms of depression are starting to last most all day and this has happened for several days.     You may have suicidal thoughts but they are not constant.   What you need to do:     1. Call your care team, your response to treatment will improve if you keep your care team informed of your progress. Yellow periods are signs an adjustment may need to be made.     2. Continue your self-care, even if you have to fake it!    3. Talk to someone in your support network    4. Open up your depression survival kit           RED    ZONE Medical Alert - Get Help    What it looks like:     Depression is seriously interfering with your life.     You may experience these or other symptoms: You can t get out of bed most days, can t work or engage in other necessary activities, you have trouble taking care of basic hygiene, or basic responsibilities, thoughts of suicide or death that will not go away, self-injurious behavior.     What you need to do:  1. Call your care team and  request a same-day appointment. If they are not available (weekends or after hours) call your local crisis line, emergency room or 911.            Depression Self Care Plan / Survival Kit    Self-Care for Depression  Here s the deal. Your body and mind are really not as separate as most people think.  What you do and think affects how you feel and how you feel influences what you do and think. This means if you do things that people who feel good do, it will help you feel better.  Sometimes this is all it takes.  There is also a place for medication and therapy depending on how severe your depression is, so be sure to consult with your medical provider and/ or Behavioral Health Consultant if your symptoms are worsening or not improving.     In order to better manage my stress, I will:    Exercise  Get some form of exercise, every day. This will help reduce pain and release endorphins, the  feel good  chemicals in your brain. This is almost as good as taking antidepressants!  This is not the same as joining a gym and then never going! (they count on that by the way ) It can be as simple as just going for a walk or doing some gardening, anything that will get you moving.      Hygiene   Maintain good hygiene (Get out of bed in the morning, Make your bed, Brush your teeth, Take a shower, and Get dressed like you were going to work, even if you are unemployed).  If your clothes don't fit try to get ones that do.    Diet  I will strive to eat foods that are good for me, drink plenty of water, and avoid excessive sugar, caffeine, alcohol, and other mood-altering substances.  Some foods that are helpful in depression are: complex carbohydrates, B vitamins, flaxseed, fish or fish oil, fresh fruits and vegetables.    Psychotherapy  I agree to participate in Individual Therapy (if recommended).    Medication  If prescribed medications, I agree to take them.  Missing doses can result in serious side effects.  I understand that  drinking alcohol, or other illicit drug use, may cause potential side effects.  I will not stop my medication abruptly without first discussing it with my provider.    Staying Connected With Others  I will stay in touch with my friends, family members, and my primary care provider/team.    Use your imagination  Be creative.  We all have a creative side; it doesn t matter if it s oil painting, sand castles, or mud pies! This will also kick up the endorphins.    Witness Beauty  (AKA stop and smell the roses) Take a look outside, even in mid-winter. Notice colors, textures. Watch the squirrels and birds.     Service to others  Be of service to others.  There is always someone else in need.  By helping others we can  get out of ourselves  and remember the really important things.  This also provides opportunities for practicing all the other parts of the program.    Humor  Laugh and be silly!  Adjust your TV habits for less news and crime-drama and more comedy.    Control your stress  Try breathing deep, massage therapy, biofeedback, and meditation. Find time to relax each day.     My support system    Clinic Contact:  Phone number:    Contact 1:  Phone number:    Contact 2:  Phone number:    Holiness/:  Phone number:    Therapist:  Phone number:    Local crisis center:    Phone number:    Other community support:  Phone number:

## 2019-08-17 ASSESSMENT — ANXIETY QUESTIONNAIRES: GAD7 TOTAL SCORE: 0

## 2019-09-30 ENCOUNTER — HEALTH MAINTENANCE LETTER (OUTPATIENT)
Age: 59
End: 2019-09-30

## 2019-12-26 ENCOUNTER — TELEPHONE (OUTPATIENT)
Dept: FAMILY MEDICINE | Facility: CLINIC | Age: 59
End: 2019-12-26

## 2019-12-26 NOTE — TELEPHONE ENCOUNTER
Panel Management Review      Patient has the following on her problem list:     Hypertension   Last three blood pressure readings:  BP Readings from Last 3 Encounters:   08/16/19 118/68   08/01/18 102/60   07/31/17 112/73     Blood pressure: Passed    HTN Guidelines:  Less than 140/90      Composite cancer screening  Chart review shows that this patient is due/due soon for the following Mammogram  Summary:    Patient is due/failing the following:   flu and MAMMOGRAM    Action needed:   Patient needs referral/order: mammogram and flu    Type of outreach:    Sent SocialStay message.    Questions for provider review:    None                                                                                                                                    Anabela Peralta MA       Chart routed to Care Team .

## 2020-09-10 ENCOUNTER — MYC MEDICAL ADVICE (OUTPATIENT)
Dept: FAMILY MEDICINE | Facility: CLINIC | Age: 60
End: 2020-09-10

## 2020-09-10 NOTE — TELEPHONE ENCOUNTER
is out of office until 9/28/20.  See note below.  Pt scheduled for Video visit with OLEG Howard.  Pt will have B/P machine for seant.  Jason

## 2020-09-14 ENCOUNTER — VIRTUAL VISIT (OUTPATIENT)
Dept: FAMILY MEDICINE | Facility: CLINIC | Age: 60
End: 2020-09-14
Payer: COMMERCIAL

## 2020-09-14 DIAGNOSIS — F32.A DEPRESSION, UNSPECIFIED DEPRESSION TYPE: ICD-10-CM

## 2020-09-14 DIAGNOSIS — I10 BENIGN HYPERTENSION: ICD-10-CM

## 2020-09-14 PROCEDURE — 99214 OFFICE O/P EST MOD 30 MIN: CPT | Mod: 95 | Performed by: NURSE PRACTITIONER

## 2020-09-14 RX ORDER — TRIAMTERENE AND HYDROCHLOROTHIAZIDE 37.5; 25 MG/1; MG/1
1 CAPSULE ORAL DAILY
Qty: 90 CAPSULE | Refills: 3 | Status: SHIPPED | OUTPATIENT
Start: 2020-09-14 | End: 2022-06-09

## 2020-09-14 RX ORDER — PAROXETINE 20 MG/1
20 TABLET, FILM COATED ORAL AT BEDTIME
Qty: 90 TABLET | Refills: 3 | Status: SHIPPED | OUTPATIENT
Start: 2020-09-14 | End: 2021-06-08

## 2020-09-14 NOTE — PROGRESS NOTES
"Arti Bui is a 60 year old female who is being evaluated via a billable telephone visit.      The patient has been notified of following:     \"This telephone visit will be conducted via a call between you and your physician/provider. We have found that certain health care needs can be provided without the need for a physical exam.  This service lets us provide the care you need with a short phone conversation.  If a prescription is necessary we can send it directly to your pharmacy.  If lab work is needed we can place an order for that and you can then stop by our lab to have the test done at a later time.    Telephone visits are billed at different rates depending on your insurance coverage. During this emergency period, for some insurers they may be billed the same as an in-person visit.  Please reach out to your insurance provider with any questions.    If during the course of the call the physician/provider feels a telephone visit is not appropriate, you will not be charged for this service.\"    Patient has given verbal consent for Telephone visit?  Yes    What phone number would you like to be contacted at? [phone]    How would you like to obtain your AVS? Sin Quiros     Arti Bui is a 60 year old female who presents via phone visit today for the following health issues:    HPI    Hypertension Follow-up      Do you check your blood pressure regularly outside of the clinic? No     Are you following a low salt diet? Yes    Are your blood pressures ever more than 140 on the top number (systolic) OR more   than 90 on the bottom number (diastolic), for example 140/90? No    Medication Followup of paroxetine for pain management     Taking Medication as prescribed: yes    Side Effects:  None    Medication Helping Symptoms:  yes        How many servings of fruits and vegetables do you eat daily?  4 or more    On average, how many sweetened beverages do you drink each day (Examples: soda, juice, " sweet tea, etc.  Do NOT count diet or artificially sweetened beverages)?   0    How many days per week do you exercise enough to make your heart beat faster? 3 or less    How many minutes a day do you exercise enough to make your heart beat faster? 60 or more    How many days per week do you miss taking your medication? 0        Review of Systems   Constitutional, HEENT, cardiovascular, pulmonary, gi and gu systems are negative, except as otherwise noted.       Objective   Vitals - Patient Reported  Systolic (Patient Reported): 119  Diastolic (Patient Reported): 69  Pulse (Patient Reported): 78      Vitals:  No vitals were obtained today due to virtual visit.    healthy, alert and no distress  PSYCH: Alert and oriented times 3; coherent speech, normal   rate and volume, able to articulate logical thoughts, able   to abstract reason, no tangential thoughts, no hallucinations   or delusions  Her affect is normal and pleasant  RESP: No cough, no audible wheezing, able to talk in full sentences  Remainder of exam unable to be completed due to telephone visits          Assessment/Plan:    Assessment & Plan     Depression, unspecified depression type  Patient states she uses this for pain management, she declines the PHQ-9  - PARoxetine (PAXIL) 20 MG tablet; Take 1 tablet (20 mg) by mouth At Bedtime    Benign hypertension  Controlled, continue  - triamterene-HCTZ (DYAZIDE) 37.5-25 MG capsule; Take 1 capsule by mouth daily  - **Basic metabolic panel FUTURE anytime; Future       FUTURE LABS:       - Schedule a non-fasting blood draw     FUTURE APPOINTMENTS:       - Follow-up for annual visit or as needed    No follow-ups on file.    LIGIA Garland [facility]    Phone call duration:  8 minutes

## 2020-09-23 DIAGNOSIS — I10 BENIGN HYPERTENSION: ICD-10-CM

## 2020-09-23 LAB
ANION GAP SERPL CALCULATED.3IONS-SCNC: 7 MMOL/L (ref 3–14)
BUN SERPL-MCNC: 16 MG/DL (ref 7–30)
CALCIUM SERPL-MCNC: 9.6 MG/DL (ref 8.5–10.1)
CHLORIDE SERPL-SCNC: 100 MMOL/L (ref 94–109)
CO2 SERPL-SCNC: 27 MMOL/L (ref 20–32)
CREAT SERPL-MCNC: 0.79 MG/DL (ref 0.52–1.04)
GFR SERPL CREATININE-BSD FRML MDRD: 82 ML/MIN/{1.73_M2}
GLUCOSE SERPL-MCNC: 94 MG/DL (ref 70–99)
POTASSIUM SERPL-SCNC: 3.4 MMOL/L (ref 3.4–5.3)
SODIUM SERPL-SCNC: 134 MMOL/L (ref 133–144)

## 2020-09-23 PROCEDURE — 36415 COLL VENOUS BLD VENIPUNCTURE: CPT | Performed by: NURSE PRACTITIONER

## 2020-09-23 PROCEDURE — 80048 BASIC METABOLIC PNL TOTAL CA: CPT | Performed by: NURSE PRACTITIONER

## 2020-09-23 NOTE — RESULT ENCOUNTER NOTE
[facility]    Your lab results came back within normal limits. Please let us know if you have any questions.     Take care,    LIGIA Ordoñez CNP

## 2021-01-15 ENCOUNTER — HEALTH MAINTENANCE LETTER (OUTPATIENT)
Age: 61
End: 2021-01-15

## 2021-05-25 ENCOUNTER — RECORDS - HEALTHEAST (OUTPATIENT)
Dept: ADMINISTRATIVE | Facility: CLINIC | Age: 61
End: 2021-05-25

## 2021-06-08 ENCOUNTER — OFFICE VISIT (OUTPATIENT)
Dept: FAMILY MEDICINE | Facility: CLINIC | Age: 61
End: 2021-06-08
Payer: COMMERCIAL

## 2021-06-08 VITALS
RESPIRATION RATE: 16 BRPM | OXYGEN SATURATION: 98 % | WEIGHT: 159 LBS | SYSTOLIC BLOOD PRESSURE: 102 MMHG | HEART RATE: 67 BPM | HEIGHT: 66 IN | DIASTOLIC BLOOD PRESSURE: 60 MMHG | BODY MASS INDEX: 25.55 KG/M2 | TEMPERATURE: 97.8 F

## 2021-06-08 DIAGNOSIS — Z12.31 ENCOUNTER FOR SCREENING MAMMOGRAM FOR BREAST CANCER: ICD-10-CM

## 2021-06-08 DIAGNOSIS — F32.A DEPRESSION, UNSPECIFIED DEPRESSION TYPE: ICD-10-CM

## 2021-06-08 DIAGNOSIS — Z11.59 ENCOUNTER FOR HEPATITIS C SCREENING TEST FOR LOW RISK PATIENT: ICD-10-CM

## 2021-06-08 DIAGNOSIS — Z00.00 ROUTINE GENERAL MEDICAL EXAMINATION AT A HEALTH CARE FACILITY: Primary | ICD-10-CM

## 2021-06-08 DIAGNOSIS — Z91.030 BEE STING ALLERGY: ICD-10-CM

## 2021-06-08 DIAGNOSIS — Z11.4 SCREENING FOR HIV (HUMAN IMMUNODEFICIENCY VIRUS): ICD-10-CM

## 2021-06-08 DIAGNOSIS — Z13.1 SCREENING FOR DIABETES MELLITUS: ICD-10-CM

## 2021-06-08 DIAGNOSIS — Z13.220 SCREENING FOR LIPOID DISORDERS: ICD-10-CM

## 2021-06-08 LAB
ANION GAP SERPL CALCULATED.3IONS-SCNC: 7 MMOL/L (ref 3–14)
BUN SERPL-MCNC: 8 MG/DL (ref 7–30)
CALCIUM SERPL-MCNC: 8.9 MG/DL (ref 8.5–10.1)
CHLORIDE SERPL-SCNC: 100 MMOL/L (ref 94–109)
CHOLEST SERPL-MCNC: 294 MG/DL
CO2 SERPL-SCNC: 28 MMOL/L (ref 20–32)
CREAT SERPL-MCNC: 0.74 MG/DL (ref 0.52–1.04)
GFR SERPL CREATININE-BSD FRML MDRD: 88 ML/MIN/{1.73_M2}
GLUCOSE SERPL-MCNC: 95 MG/DL (ref 70–99)
HCV AB SERPL QL IA: NONREACTIVE
HDLC SERPL-MCNC: 58 MG/DL
HIV 1+2 AB+HIV1 P24 AG SERPL QL IA: NONREACTIVE
LDLC SERPL CALC-MCNC: 198 MG/DL
NONHDLC SERPL-MCNC: 236 MG/DL
POTASSIUM SERPL-SCNC: 3.7 MMOL/L (ref 3.4–5.3)
SODIUM SERPL-SCNC: 135 MMOL/L (ref 133–144)
TRIGL SERPL-MCNC: 190 MG/DL

## 2021-06-08 PROCEDURE — 80061 LIPID PANEL: CPT | Performed by: NURSE PRACTITIONER

## 2021-06-08 PROCEDURE — 99213 OFFICE O/P EST LOW 20 MIN: CPT | Mod: 25 | Performed by: NURSE PRACTITIONER

## 2021-06-08 PROCEDURE — 36415 COLL VENOUS BLD VENIPUNCTURE: CPT | Performed by: NURSE PRACTITIONER

## 2021-06-08 PROCEDURE — 80048 BASIC METABOLIC PNL TOTAL CA: CPT | Performed by: NURSE PRACTITIONER

## 2021-06-08 PROCEDURE — 87389 HIV-1 AG W/HIV-1&-2 AB AG IA: CPT | Performed by: NURSE PRACTITIONER

## 2021-06-08 PROCEDURE — 86803 HEPATITIS C AB TEST: CPT | Performed by: NURSE PRACTITIONER

## 2021-06-08 PROCEDURE — 99396 PREV VISIT EST AGE 40-64: CPT | Performed by: NURSE PRACTITIONER

## 2021-06-08 RX ORDER — DIPHENHYDRAMINE HCL 25 MG
25 CAPSULE ORAL EVERY 6 HOURS PRN
COMMUNITY

## 2021-06-08 RX ORDER — PAROXETINE 20 MG/1
20 TABLET, FILM COATED ORAL AT BEDTIME
Qty: 90 TABLET | Refills: 3 | Status: SHIPPED | OUTPATIENT
Start: 2021-06-08 | End: 2022-05-23

## 2021-06-08 RX ORDER — EPINEPHRINE 0.3 MG/.3ML
0.3 INJECTION SUBCUTANEOUS
Qty: 0.3 ML | Refills: 3 | Status: SHIPPED | OUTPATIENT
Start: 2021-06-08 | End: 2023-06-28

## 2021-06-08 ASSESSMENT — ANXIETY QUESTIONNAIRES
6. BECOMING EASILY ANNOYED OR IRRITABLE: NOT AT ALL
GAD7 TOTAL SCORE: 0
5. BEING SO RESTLESS THAT IT IS HARD TO SIT STILL: NOT AT ALL
1. FEELING NERVOUS, ANXIOUS, OR ON EDGE: NOT AT ALL
2. NOT BEING ABLE TO STOP OR CONTROL WORRYING: NOT AT ALL
7. FEELING AFRAID AS IF SOMETHING AWFUL MIGHT HAPPEN: NOT AT ALL
3. WORRYING TOO MUCH ABOUT DIFFERENT THINGS: NOT AT ALL
IF YOU CHECKED OFF ANY PROBLEMS ON THIS QUESTIONNAIRE, HOW DIFFICULT HAVE THESE PROBLEMS MADE IT FOR YOU TO DO YOUR WORK, TAKE CARE OF THINGS AT HOME, OR GET ALONG WITH OTHER PEOPLE: NOT DIFFICULT AT ALL

## 2021-06-08 ASSESSMENT — PATIENT HEALTH QUESTIONNAIRE - PHQ9
SUM OF ALL RESPONSES TO PHQ QUESTIONS 1-9: 0
5. POOR APPETITE OR OVEREATING: NOT AT ALL

## 2021-06-08 ASSESSMENT — MIFFLIN-ST. JEOR: SCORE: 1316.94

## 2021-06-08 NOTE — LETTER
June 11, 2021      Arti CELSA Bui  [address] [facility] [address]        Dear ,    We are writing to inform you of your test results.    All labs done were within acceptable range, see risk below.   Repeat in one year.     Resulted Orders   HIV Antigen Antibody Combo   Result Value Ref Range    HIV Antigen Antibody Combo Nonreactive NR^Nonreactive          Comment:      HIV-1 p24 Ag & HIV-1/HIV-2 Ab Not Detected   Hepatitis C antibody   Result Value Ref Range    Hepatitis C Antibody Nonreactive NR^Nonreactive      Comment:      Assay performance characteristics have not been established for newborns,   infants, and children     Lipid panel reflex to direct LDL Fasting   Result Value Ref Range    Cholesterol 294 (H) <200 mg/dL      Comment:      Desirable:       <200 mg/dl    Triglycerides 190 (H) <150 mg/dL      Comment:      Borderline high:  150-199 mg/dl  High:             200-499 mg/dl  Very high:       >499 mg/dl  Fasting specimen      HDL Cholesterol 58 >49 mg/dL    LDL Cholesterol Calculated 198 (H) <100 mg/dL      Comment:      Above desirable:  100-129 mg/dl  Borderline High:  130-159 mg/dL  High:             160-189 mg/dL  Very high:       >189 mg/dl      Non HDL Cholesterol 236 (H) <130 mg/dL      Comment:      Above Desirable:  130-159 mg/dl  Borderline high:  160-189 mg/dl  High:             190-219 mg/dl  Very high:       >219 mg/dl     Basic metabolic panel  (Ca, Cl, CO2, Creat, Gluc, K, Na, BUN)   Result Value Ref Range    Sodium 135 133 - 144 mmol/L    Potassium 3.7 3.4 - 5.3 mmol/L    Chloride 100 94 - 109 mmol/L    Carbon Dioxide 28 20 - 32 mmol/L    Anion Gap 7 3 - 14 mmol/L    Glucose 95 70 - 99 mg/dL      Comment:      Fasting specimen    Urea Nitrogen 8 7 - 30 mg/dL    Creatinine 0.74 0.52 - 1.04 mg/dL    GFR Estimate 88 >60 mL/min/[1.73_m2]      Comment:      Non  GFR Calc  Starting 12/18/2018, serum creatinine based estimated GFR (eGFR) will be    calculated using the Chronic Kidney Disease Epidemiology Collaboration   (CKD-EPI) equation.      GFR Estimate If Black >90 >60 mL/min/[1.73_m2]      Comment:       GFR Calc  Starting 12/18/2018, serum creatinine based estimated GFR (eGFR) will be   calculated using the Chronic Kidney Disease Epidemiology Collaboration   (CKD-EPI) equation.      Calcium 8.9 8.5 - 10.1 mg/dL       If you have any questions or concerns, please call the clinic at the number listed above.       Sincerely,      LIGIA Lee CNP

## 2021-06-08 NOTE — PATIENT INSTRUCTIONS
Will be notified of pending labs.  Call to schedule mammogram.  Call insurance to ask about shingles vaccination, Shingrix.  Bring in copy of advanced care directives.    Preventive Health Recommendations  Female Ages 50 - 64    Yearly exam: See your health care provider every year in order to  o Review health changes.   o Discuss preventive care.    o Review your medicines if your doctor has prescribed any.      Get a Pap test every three years (unless you have an abnormal result and your provider advises testing more often).    If you get Pap tests with HPV test, you only need to test every 5 years, unless you have an abnormal result.     You do not need a Pap test if your uterus was removed (hysterectomy) and you have not had cancer.    You should be tested each year for STDs (sexually transmitted diseases) if you're at risk.     Have a mammogram every 1 to 2 years.    Have a colonoscopy at age 50, or have a yearly FIT test (stool test). These exams screen for colon cancer.      Have a cholesterol test every 5 years, or more often if advised.    Have a diabetes test (fasting glucose) every three years. If you are at risk for diabetes, you should have this test more often.     If you are at risk for osteoporosis (brittle bone disease), think about having a bone density scan (DEXA).    Shots: Get a flu shot each year. Get a tetanus shot every 10 years.    Nutrition:     Eat at least 5 servings of fruits and vegetables each day.    Eat whole-grain bread, whole-wheat pasta and brown rice instead of white grains and rice.    Get adequate Calcium and Vitamin D.     Lifestyle    Exercise at least 150 minutes a week (30 minutes a day, 5 days a week). This will help you control your weight and prevent disease.    Limit alcohol to one drink per day.    No smoking.     Wear sunscreen to prevent skin cancer.     See your dentist every six months for an exam and cleaning.    See your eye doctor every 1 to 2 years.

## 2021-06-08 NOTE — PROGRESS NOTES
"   SUBJECTIVE:   CC: Arti Bui is an 61 year old woman who presents for preventive health visit.         Chief Complaint   Patient presents with     New Patient     Physical       Patient has been advised of split billing requirements and indicates understanding: Yes  Healthy Habits:    Do you get at least three servings of calcium containing foods daily (dairy, green leafy vegetables, etc.)? yes    Amount of exercise or daily activities, outside of work: 6 day(s) per week    Problems taking medications regularly No    Medication side effects: No    Have you had an eye exam in the past two years? yes    Do you see a dentist twice per year? yes    Do you have sleep apnea, excessive snoring or daytime drowsiness?yes      Chronic Pain Follow-Up    Where in your body do you have pain? Hands and jaw   How has your pain affected your ability to work? Not applicable  Which of these pain treatments have you tried since your last clinic visit? Other: none  How well are you sleeping? Good  How has your mood been since your last visit? About the same  Have you had a significant life event? No  Other aggravating factors: heat   Taking medication as directed? Yes      No concerns, is generally feeling well. Retired. She used to see Dr. Shetty for \"years\"  Needs refills.      PHQ-9 SCORE 8/1/2018 8/16/2019 6/8/2021   PHQ-9 Total Score - - -   PHQ-9 Total Score MyChart - - -   PHQ-9 Total Score 3 0 0     JONNY-7 SCORE 8/1/2018 8/16/2019 6/8/2021   Total Score - - -   Total Score 0 0 0     No flowsheet data found.  Encounter-Level CSA:    There are no encounter-level csa.     Patient-Level CSA:    There are no patient-level csa.         Today's PHQ-2 Score:   PHQ-2 ( 1999 [employer]) 6/8/2021 8/14/2019   Q1: Little interest or pleasure in doing things 0 0   Q2: Feeling down, depressed or hopeless 0 0   PHQ-2 Score 0 0   Q1: Little interest or pleasure in doing things - Not at all   Q2: Feeling down, depressed or hopeless - Not at all "   PHQ-2 Score - 0       Abuse: Current or Past(Physical, Sexual or Emotional)- No  Do you feel safe in your environment? Yes    Have you ever done Advance Care Planning? (For example, a Health Directive, POLST, or a discussion with a medical provider or your loved ones about your wishes): No, advance care planning information given to patient to review.  Patient plans to discuss their wishes with loved ones or provider.      Social History     Tobacco Use     Smoking status: Former Smoker     Packs/day: 0.50     Years: 20.00     Pack years: 10.00     Types: Cigarettes     Quit date: 2013     Years since quittin.9     Smokeless tobacco: Former User     Quit date: 2013   Substance Use Topics     Alcohol use: Yes     Comment: occ     If you drink alcohol do you typically have >3 drinks per day or >7 drinks per week? No                     Reviewed orders with patient.  Reviewed health maintenance and updated orders accordingly - Yes  BP Readings from Last 3 Encounters:   21 102/60   19 118/68   18 102/60    Wt Readings from Last 3 Encounters:   21 72.1 kg (159 lb)   19 84.8 kg (187 lb)   18 86.2 kg (190 lb)                  Patient Active Problem List   Diagnosis     Disease of jaw     HORMONAL REPLACEMT POSTMENOPAUSAL     Tobacco use disorder     CARDIOVASCULAR SCREENING; LDL GOAL LESS THAN 160     Chronic pain     Osteoarthritis     Arthralgia     Advanced directives, counseling/discussion     Hyperplastic colon polyp     Primary osteoarthritis involving multiple joints     Meningioma (H)     Memory loss     Benign hypertension     Past Surgical History:   Procedure Laterality Date     HYSTERECTOMY, PAP NO LONGER INDICATED       SURGICAL HISTORY OF -       lap tubal ligation      SURGICAL HISTORY OF -       sinus surgery     SURGICAL HISTORY OF -        joint surgery      SURGICAL HISTORY OF -       total vaginal hysterectomy unilateral oophorectomy      SURGICAL HISTORY OF -       salpingectomy - ectopic pregnancy      SURGICAL HISTORY OF -       tonsillectomy       Social History     Tobacco Use     Smoking status: Former Smoker     Packs/day: 0.50     Years: 20.00     Pack years: 10.00     Types: Cigarettes     Quit date: 2013     Years since quittin.9     Smokeless tobacco: Former User     Quit date: 2013   Substance Use Topics     Alcohol use: Yes     Comment: occ     Family History   Problem Relation Age of Onset     Blood Disease Mother         brain damage from encephalitis     Parkinsonism Mother      Diabetes Father         diet controlled     Cardiovascular Father         stent     Parkinsonism Father         demetia form not shaky     Diabetes Brother         juvenille      Diabetes Brother      Diabetes Brother          Current Outpatient Medications   Medication Sig Dispense Refill     diphenhydrAMINE (ALLERGY RELIEF) 25 MG capsule Take 25 mg by mouth every 6 hours as needed for itching or allergies       EPINEPHrine (ANY BX GENERIC EQUIV) 0.3 MG/0.3ML injection 2-pack Inject 0.3 mLs (0.3 mg) into the muscle once as needed for anaphylaxis 0.3 mL 3     MULTIVITAMINS PO TABS Reported on 2017  3     PARoxetine (PAXIL) 20 MG tablet Take 1 tablet (20 mg) by mouth At Bedtime 90 tablet 3     TURMERIC PO        triamterene-HCTZ (DYAZIDE) 37.5-25 MG capsule Take 1 capsule by mouth daily (Patient not taking: Reported on 2021) 90 capsule 3     Allergies   Allergen Reactions     Bee      Throat closes     Morphine Nausea and Vomiting     Recent Labs   Lab Test 20  0703 19  0913 17  0855 17  0855 06/29/15  1005 13  0911   LDL  --   --   --   --  128  --    HDL  --   --   --   --  66  --    TRIG  --   --   --   --  158*  --    ALT  --   --   --   --  26 34   CR 0.79 0.79   < >  --  0.73 0.68   GFRESTIMATED 82 83   < >  --  83 >90   GFRESTBLACK >90 >90   < >  --  >90   GFR Calc   >90   POTASSIUM  3.4 4.2   < >  --  3.9 4.2   TSH  --   --   --  1.15  --  1.86    < > = values in this interval not displayed.        FSH-7: No flowsheet data found.    Mammogram Screening: Recommended mammography every 1-2 years with patient discussion and risk factor consideration  Pertinent mammograms are reviewed under the imaging tab.    Pertinent mammograms are reviewed under the imaging tab.  History of abnormal Pap smear: Status post benign hysterectomy. Health Maintenance and Surgical History updated.  PAP / HPV 6/6/2007   PAP NIL     Reviewed and updated as needed this visit by clinical staff  Tobacco   Meds   Med Hx  Surg Hx  Fam Hx  Soc Hx        Reviewed and updated as needed this visit by Provider                Past Medical History:   Diagnosis Date     Allergic reaction to bee sting      Primary osteoarthritis involving multiple joints      Temporomandibular joint disorders, unspecified       Past Surgical History:   Procedure Laterality Date     HYSTERECTOMY, PAP NO LONGER INDICATED       SURGICAL HISTORY OF -   03/98    lap tubal ligation      SURGICAL HISTORY OF -   06/00    sinus surgery     SURGICAL HISTORY OF -       TM joint surgery      SURGICAL HISTORY OF -   2003    total vaginal hysterectomy unilateral oophorectomy     SURGICAL HISTORY OF -       salpingectomy - ectopic pregnancy      SURGICAL HISTORY OF -       tonsillectomy       ROS:  CONSTITUTIONAL: NEGATIVE for fever, chills, change in weight  INTEGUMENTARY/SKIN: NEGATIVE for worrisome rashes, moles or lesions  EYES: NEGATIVE for vision changes or irritation  ENT: NEGATIVE for ear, mouth and throat problems  RESP: NEGATIVE for significant cough or SOB  BREAST: NEGATIVE for masses, tenderness or discharge  CV: NEGATIVE for chest pain, palpitations or peripheral edema  GI: NEGATIVE for nausea, abdominal pain, heartburn, or change in bowel habits  : NEGATIVE for unusual urinary or vaginal symptoms. No vaginal bleeding.  MUSCULOSKELETAL: NEGATIVE  "for significant arthralgias or myalgia  NEURO: NEGATIVE for weakness, dizziness or paresthesias  PSYCHIATRIC: NEGATIVE for changes in mood or affect     OBJECTIVE:   /60 (BP Location: Right arm, Patient Position: Chair, Cuff Size: Adult Regular)   Pulse 67   Temp 97.8  F (36.6  C)   Resp 16   Ht 1.683 m (5' 6.25\")   Wt 72.1 kg (159 lb)   LMP 08/16/2003   SpO2 98%   BMI 25.47 kg/m    EXAM:  GENERAL: healthy, alert and no distress  EYES: Eyes grossly normal to inspection, PERRL and conjunctivae and sclerae normal  HENT: ear canals and TM's normal, nose and mouth without ulcers or lesions  NECK: no adenopathy, no asymmetry, masses, or scars and thyroid normal to palpation  RESP: lungs clear to auscultation - no rales, rhonchi or wheezes  CV: regular rate and rhythm, normal S1 S2, no S3 or S4, no murmur, click or rub, no peripheral edema and peripheral pulses strong  ABDOMEN: soft, nontender, no hepatosplenomegaly, no masses and bowel sounds normal  MS: no gross musculoskeletal defects noted, no edema  SKIN: no suspicious lesions or rashes  NEURO: Normal strength and tone, mentation intact and speech normal  PSYCH: mentation appears normal, affect normal/bright    Diagnostic Test Results:  Labs reviewed in Epic  No results found for this or any previous visit (from the past 24 hour(s)).    ASSESSMENT/PLAN:   1. Routine general medical examination at a health care facility      2. Bee sting allergy    - EPINEPHrine (ANY BX GENERIC EQUIV) 0.3 MG/0.3ML injection 2-pack; Inject 0.3 mLs (0.3 mg) into the muscle once as needed for anaphylaxis  Dispense: 0.3 mL; Refill: 3    3. Depression, unspecified depression type    - PARoxetine (PAXIL) 20 MG tablet; Take 1 tablet (20 mg) by mouth At Bedtime  Dispense: 90 tablet; Refill: 3  Stable, no concerns, takes this more for pain in jaw and general osteoarthritic pain.    4. Encounter for screening mammogram for breast cancer    - *MA Screening Digital Bilateral; " Future    5. Screening for HIV (human immunodeficiency virus)    - HIV Antigen Antibody Combo    6. Encounter for hepatitis C screening test for low risk patient    - Hepatitis C antibody    7. Screening for lipoid disorders  - Lipid panel reflex to direct LDL Fasting    8. Screening for diabetes mellitus    - Basic metabolic panel  (Ca, Cl, CO2, Creat, Gluc, K, Na, BUN)    Patient Instructions   Will be notified of pending labs.  Call to schedule mammogram.  Call insurance to ask about shingles vaccination, Shingrix.  Bring in copy of advanced care directives.    Preventive Health Recommendations  Female Ages 50 - 64    Yearly exam: See your health care provider every year in order to  o Review health changes.   o Discuss preventive care.    o Review your medicines if your doctor has prescribed any.      Get a Pap test every three years (unless you have an abnormal result and your provider advises testing more often).    If you get Pap tests with HPV test, you only need to test every 5 years, unless you have an abnormal result.     You do not need a Pap test if your uterus was removed (hysterectomy) and you have not had cancer.    You should be tested each year for STDs (sexually transmitted diseases) if you're at risk.     Have a mammogram every 1 to 2 years.    Have a colonoscopy at age 50, or have a yearly FIT test (stool test). These exams screen for colon cancer.      Have a cholesterol test every 5 years, or more often if advised.    Have a diabetes test (fasting glucose) every three years. If you are at risk for diabetes, you should have this test more often.     If you are at risk for osteoporosis (brittle bone disease), think about having a bone density scan (DEXA).    Shots: Get a flu shot each year. Get a tetanus shot every 10 years.    Nutrition:     Eat at least 5 servings of fruits and vegetables each day.    Eat whole-grain bread, whole-wheat pasta and brown rice instead of white grains and  "rice.    Get adequate Calcium and Vitamin D.     Lifestyle    Exercise at least 150 minutes a week (30 minutes a day, 5 days a week). This will help you control your weight and prevent disease.    Limit alcohol to one drink per day.    No smoking.     Wear sunscreen to prevent skin cancer.     See your dentist every six months for an exam and cleaning.    See your eye doctor every 1 to 2 years.        COUNSELING:   Reviewed preventive health counseling, as reflected in patient instructions       Regular exercise       Healthy diet/nutrition       Vision screening       Consider Hep C screening for all patients one time for ages 18-79 years       HIV screeninx in teen years, 1x in adult years, and at intervals if high risk       Advance Care Planning    Estimated body mass index is 25.47 kg/m  as calculated from the following:    Height as of this encounter: 1.683 m (5' 6.25\").    Weight as of this encounter: 72.1 kg (159 lb).        She reports that she quit smoking about 7 years ago. Her smoking use included cigarettes. She has a 10.00 pack-year smoking history. She quit smokeless tobacco use about 7 years ago.      Counseling Resources:  ATP IV Guidelines  Pooled Cohorts Equation Calculator  Breast Cancer Risk Calculator  BRCA-Related Cancer Risk Assessment: FHS-7 Tool  FRAX Risk Assessment  ICSI Preventive Guidelines  Dietary Guidelines for Americans,   USDA's MyPlate  ASA Prophylaxis  Lung CA Screening    LIGIA Lee CNP  [facility]  "

## 2021-06-09 ASSESSMENT — ANXIETY QUESTIONNAIRES: GAD7 TOTAL SCORE: 0

## 2021-10-24 ENCOUNTER — HEALTH MAINTENANCE LETTER (OUTPATIENT)
Age: 61
End: 2021-10-24

## 2021-11-05 ENCOUNTER — HOSPITAL ENCOUNTER (OUTPATIENT)
Dept: MAMMOGRAPHY | Facility: CLINIC | Age: 61
Discharge: HOME | End: 2021-11-05
Attending: NURSE PRACTITIONER | Admitting: NURSE PRACTITIONER
Payer: COMMERCIAL

## 2021-11-05 DIAGNOSIS — Z12.31 ENCOUNTER FOR SCREENING MAMMOGRAM FOR BREAST CANCER: ICD-10-CM

## 2021-11-05 PROCEDURE — 77063 BREAST TOMOSYNTHESIS BI: CPT

## 2021-11-10 ENCOUNTER — HOSPITAL ENCOUNTER (OUTPATIENT)
Dept: ULTRASOUND IMAGING | Facility: CLINIC | Age: 61
Discharge: HOME | End: 2021-11-10
Attending: NURSE PRACTITIONER | Admitting: NURSE PRACTITIONER
Payer: COMMERCIAL

## 2021-11-10 DIAGNOSIS — R92.8 ABNORMAL MAMMOGRAM: ICD-10-CM

## 2021-11-10 PROCEDURE — 76642 ULTRASOUND BREAST LIMITED: CPT | Mod: RT

## 2022-06-09 ENCOUNTER — OFFICE VISIT (OUTPATIENT)
Dept: FAMILY MEDICINE | Facility: CLINIC | Age: 62
End: 2022-06-09
Payer: COMMERCIAL

## 2022-06-09 VITALS
SYSTOLIC BLOOD PRESSURE: 120 MMHG | WEIGHT: 152.4 LBS | OXYGEN SATURATION: 100 % | TEMPERATURE: 97.7 F | HEIGHT: 66 IN | DIASTOLIC BLOOD PRESSURE: 72 MMHG | BODY MASS INDEX: 24.49 KG/M2 | RESPIRATION RATE: 14 BRPM | HEART RATE: 55 BPM

## 2022-06-09 DIAGNOSIS — F32.A DEPRESSION, UNSPECIFIED DEPRESSION TYPE: ICD-10-CM

## 2022-06-09 PROCEDURE — 99213 OFFICE O/P EST LOW 20 MIN: CPT | Performed by: FAMILY MEDICINE

## 2022-06-09 RX ORDER — PAROXETINE 20 MG/1
20 TABLET, FILM COATED ORAL AT BEDTIME
Qty: 90 TABLET | Refills: 3 | Status: SHIPPED | OUTPATIENT
Start: 2022-06-09 | End: 2023-06-28

## 2022-06-09 ASSESSMENT — PATIENT HEALTH QUESTIONNAIRE - PHQ9
SUM OF ALL RESPONSES TO PHQ QUESTIONS 1-9: 0
SUM OF ALL RESPONSES TO PHQ QUESTIONS 1-9: 0

## 2022-06-09 ASSESSMENT — PAIN SCALES - GENERAL: PAINLEVEL: NO PAIN (0)

## 2022-06-09 NOTE — PROGRESS NOTES
"  Assessment & Plan     Depression, unspecified depression type  Refills provided.   - PARoxetine (PAXIL) 20 MG tablet  Dispense: 90 tablet; Refill: 3    Declined COVID booster  Encouraged shingles vaccine  Encouraged to schedule annual exam this fall.       Return in about 6 months (around 12/9/2022) for Routine preventive.    SARAH CASTELAN DO  [facility]    Chula Chi is a 62 year old who presents for the following health issues.    History of Present Illness       Mental Health Follow-up:  Patient presents to follow-up on Depression.Patient's depression since last visit has been:  Good  The patient is not having other symptoms associated with depression.      Any significant life events: No  Patient is not feeling anxious or having panic attacks.  Patient has no concerns about alcohol or drug use.    Reason for visit:  RX refill    She eats 2-3 servings of fruits and vegetables daily.She consumes 0 sweetened beverage(s) daily.She exercises with enough effort to increase her heart rate 30 to 60 minutes per day.  She exercises with enough effort to increase her heart rate 7 days per week.   She is taking medications regularly.    Today's PHQ-9         PHQ-9 Total Score: 0    PHQ-9 Q9 Thoughts of better off dead/self-harm past 2 weeks :   Not at all        -She had been taking Paxil-using for pain after many jaw surgeries.     On anti-histamine year round-zyrtec.     Feeling well, has upcoming travel for the summer-[facility], [place] and [place].     Review of Systems   Constitutional, HEENT, cardiovascular, pulmonary, gi and gu systems are negative, except as otherwise noted.      Objective    /72 (BP Location: Right arm, Patient Position: Chair, Cuff Size: Adult Regular)   Pulse 55   Temp 97.7  F (36.5  C) (Tympanic)   Resp 14   Ht 1.683 m (5' 6.25\")   Wt 69.1 kg (152 lb 6.4 oz)   LMP 08/16/2003   SpO2 100%   BMI 24.41 kg/m    Body mass index is 24.41 " kg/m .  Physical Exam  Constitutional:       General: She is not in acute distress.     Appearance: She is well-developed.   HENT:      Right Ear: Tympanic membrane and external ear normal.      Left Ear: Tympanic membrane and external ear normal.      Nose: Nose normal.      Mouth/Throat:      Pharynx: No oropharyngeal exudate.   Eyes:      General:         Right eye: No discharge.         Left eye: No discharge.      Conjunctiva/sclera: Conjunctivae normal.      Pupils: Pupils are equal, round, and reactive to light.   Neck:      Thyroid: No thyromegaly.      Trachea: No tracheal deviation.   Cardiovascular:      Rate and Rhythm: Normal rate and regular rhythm.      Pulses: Normal pulses.      Heart sounds: Normal heart sounds, S1 normal and S2 normal. No murmur heard.    No friction rub. No S3 or S4 sounds.   Pulmonary:      Effort: Pulmonary effort is normal. No respiratory distress.      Breath sounds: Normal breath sounds. No wheezing or rales.   Abdominal:      General: Bowel sounds are normal.      Palpations: Abdomen is soft. There is no mass.      Tenderness: There is no abdominal tenderness.   Musculoskeletal:         General: Normal range of motion.      Cervical back: Neck supple.   Lymphadenopathy:      Cervical: No cervical adenopathy.   Skin:     General: Skin is warm and dry.      Findings: No rash.   Neurological:      Mental Status: She is alert and oriented to person, place, and time.      Motor: No abnormal muscle tone.      Deep Tendon Reflexes: Reflexes are normal and symmetric.   Psychiatric:         Thought Content: Thought content normal.         Judgment: Judgment normal.

## 2022-07-31 ENCOUNTER — HEALTH MAINTENANCE LETTER (OUTPATIENT)
Age: 62
End: 2022-07-31

## 2022-10-15 ENCOUNTER — HEALTH MAINTENANCE LETTER (OUTPATIENT)
Age: 62
End: 2022-10-15

## 2023-06-28 ENCOUNTER — LAB (OUTPATIENT)
Dept: LAB | Facility: CLINIC | Age: 63
End: 2023-06-28
Payer: COMMERCIAL

## 2023-06-28 ENCOUNTER — OFFICE VISIT (OUTPATIENT)
Dept: FAMILY MEDICINE | Facility: CLINIC | Age: 63
End: 2023-06-28
Payer: COMMERCIAL

## 2023-06-28 VITALS
OXYGEN SATURATION: 99 % | TEMPERATURE: 96.8 F | HEIGHT: 67 IN | DIASTOLIC BLOOD PRESSURE: 60 MMHG | SYSTOLIC BLOOD PRESSURE: 120 MMHG | BODY MASS INDEX: 22.76 KG/M2 | RESPIRATION RATE: 16 BRPM | WEIGHT: 145 LBS | HEART RATE: 78 BPM

## 2023-06-28 DIAGNOSIS — Z00.00 ROUTINE GENERAL MEDICAL EXAMINATION AT A HEALTH CARE FACILITY: Primary | ICD-10-CM

## 2023-06-28 DIAGNOSIS — Z12.31 ENCOUNTER FOR SCREENING MAMMOGRAM FOR BREAST CANCER: ICD-10-CM

## 2023-06-28 DIAGNOSIS — Z91.030 BEE STING ALLERGY: ICD-10-CM

## 2023-06-28 DIAGNOSIS — I10 BENIGN HYPERTENSION: ICD-10-CM

## 2023-06-28 DIAGNOSIS — F32.A DEPRESSION, UNSPECIFIED DEPRESSION TYPE: ICD-10-CM

## 2023-06-28 LAB
ANION GAP SERPL CALCULATED.3IONS-SCNC: 11 MMOL/L (ref 7–15)
BUN SERPL-MCNC: 8.3 MG/DL (ref 8–23)
CALCIUM SERPL-MCNC: 10.6 MG/DL (ref 8.8–10.2)
CHLORIDE SERPL-SCNC: 101 MMOL/L (ref 98–107)
CREAT SERPL-MCNC: 0.76 MG/DL (ref 0.51–0.95)
DEPRECATED HCO3 PLAS-SCNC: 29 MMOL/L (ref 22–29)
GFR SERPL CREATININE-BSD FRML MDRD: 89 ML/MIN/1.73M2
GLUCOSE SERPL-MCNC: 80 MG/DL (ref 70–99)
POTASSIUM SERPL-SCNC: 3.9 MMOL/L (ref 3.4–5.3)
SODIUM SERPL-SCNC: 141 MMOL/L (ref 136–145)

## 2023-06-28 PROCEDURE — 99214 OFFICE O/P EST MOD 30 MIN: CPT | Mod: 25 | Performed by: NURSE PRACTITIONER

## 2023-06-28 PROCEDURE — 80048 BASIC METABOLIC PNL TOTAL CA: CPT

## 2023-06-28 PROCEDURE — 99396 PREV VISIT EST AGE 40-64: CPT | Performed by: NURSE PRACTITIONER

## 2023-06-28 PROCEDURE — 36415 COLL VENOUS BLD VENIPUNCTURE: CPT

## 2023-06-28 RX ORDER — EPINEPHRINE 0.3 MG/.3ML
0.3 INJECTION SUBCUTANEOUS
Qty: 0.3 ML | Refills: 3 | Status: SHIPPED | OUTPATIENT
Start: 2023-06-28

## 2023-06-28 RX ORDER — PAROXETINE 20 MG/1
20 TABLET, FILM COATED ORAL AT BEDTIME
Qty: 90 TABLET | Refills: 3 | Status: ON HOLD | OUTPATIENT
Start: 2023-06-28 | End: 2024-06-04

## 2023-06-28 ASSESSMENT — ENCOUNTER SYMPTOMS
HEMATOCHEZIA: 0
CHILLS: 0
MYALGIAS: 0
PARESTHESIAS: 0
JOINT SWELLING: 1
ARTHRALGIAS: 1
FEVER: 0
DYSURIA: 0
COUGH: 0
HEARTBURN: 0
SORE THROAT: 0
DIZZINESS: 0
WEAKNESS: 0
NAUSEA: 0
EYE PAIN: 0
FREQUENCY: 1
NERVOUS/ANXIOUS: 0
ABDOMINAL PAIN: 0
HEADACHES: 0
BREAST MASS: 0
SHORTNESS OF BREATH: 0
CONSTIPATION: 0
DIARRHEA: 0
PALPITATIONS: 0

## 2023-06-28 ASSESSMENT — PATIENT HEALTH QUESTIONNAIRE - PHQ9
SUM OF ALL RESPONSES TO PHQ QUESTIONS 1-9: 0
10. IF YOU CHECKED OFF ANY PROBLEMS, HOW DIFFICULT HAVE THESE PROBLEMS MADE IT FOR YOU TO DO YOUR WORK, TAKE CARE OF THINGS AT HOME, OR GET ALONG WITH OTHER PEOPLE: NOT DIFFICULT AT ALL
SUM OF ALL RESPONSES TO PHQ QUESTIONS 1-9: 0

## 2023-06-28 ASSESSMENT — PAIN SCALES - GENERAL: PAINLEVEL: NO PAIN (0)

## 2023-06-28 NOTE — PATIENT INSTRUCTIONS
Will be notified of pending labs.  Call to schedule mammogram this November, [phone].    Preventive Health Recommendations  Female Ages 50 - 64    Yearly exam: See your health care provider every year in order to  Review health changes.   Discuss preventive care.    Review your medicines if your doctor has prescribed any.    Get a Pap test every three years (unless you have an abnormal result and your provider advises testing more often).  If you get Pap tests with HPV test, you only need to test every 5 years, unless you have an abnormal result.   You do not need a Pap test if your uterus was removed (hysterectomy) and you have not had cancer.  You should be tested each year for STDs (sexually transmitted diseases) if you're at risk.   Have a mammogram every 1 to 2 years.  Have a colonoscopy at age 50, or have a yearly FIT test (stool test). These exams screen for colon cancer.    Have a cholesterol test every 5 years, or more often if advised.  Have a diabetes test (fasting glucose) every three years. If you are at risk for diabetes, you should have this test more often.   If you are at risk for osteoporosis (brittle bone disease), think about having a bone density scan (DEXA).    Shots: Get a flu shot each year. Get a tetanus shot every 10 years.    Nutrition:   Eat at least 5 servings of fruits and vegetables each day.  Eat whole-grain bread, whole-wheat pasta and brown rice instead of white grains and rice.  Get adequate Calcium and Vitamin D.     Lifestyle  Exercise at least 150 minutes a week (30 minutes a day, 5 days a week). This will help you control your weight and prevent disease.  Limit alcohol to one drink per day.  No smoking.   Wear sunscreen to prevent skin cancer.   See your dentist every six months for an exam and cleaning.  See your eye doctor every 1 to 2 years.

## 2023-06-28 NOTE — PROGRESS NOTES
Answers for HPI/ROS submitted by the patient on 6/28/2023  If you checked off any problems, how difficult have these problems made it for you to do your work, take care of things at home, or get along with other people?: Not difficult at all  PHQ9 TOTAL SCORE: 0  Frequency of exercise:: 6-7 days/week  Getting at least 3 servings of Calcium per day:: Yes  Diet:: Regular (no restrictions)  Taking medications regularly:: Yes  Medication side effects:: Not applicable  Bi-annual eye exam:: Yes  Dental care twice a year:: Yes  Sleep apnea or symptoms of sleep apnea:: None  abdominal pain: No  Blood in stool: No  chest pain: No  chills: No  congestion: No  constipation: No  cough: No  diarrhea: No  dizziness: No  ear pain: No  eye pain: No  nervous/anxious: No  fever: No  frequency: Yes  genital sores: No  headaches: No  hearing loss: No  heartburn: No  arthralgias: Yes  joint swelling: Yes  peripheral edema: No  mood changes: No  myalgias: No  nausea: No  dysuria: No  palpitations: No  Skin sensation changes: No  sore throat: No  urgency: No  rash: No  shortness of breath: No  visual disturbance: No  weakness: No  pelvic pain: No  vaginal bleeding: No  vaginal discharge: No  tenderness: No  breast mass: No  breast discharge: No  Additional concerns today:: No  Duration of exercise:: 30-45 minutes

## 2023-06-28 NOTE — PROGRESS NOTES
SUBJECTIVE:   CC: Arti is an 63 year old who presents for preventive health visit.       2023     3:48 PM   Additional Questions   Roomed by      Healthy Habits:     Getting at least 3 servings of Calcium per day:  Yes    Bi-annual eye exam:  Yes    Dental care twice a year:  Yes    Sleep apnea or symptoms of sleep apnea:  None    Diet:  Regular (no restrictions)    Frequency of exercise:  6-7 days/week    Duration of exercise:  30-45 minutes    Taking medications regularly:  Yes    Medication side effects:  Not applicable    PHQ-2 Total Score: 0    Additional concerns today:  No                      Social History     Tobacco Use     Smoking status: Former     Packs/day: 0.50     Years: 20.00     Pack years: 10.00     Types: Cigarettes     Quit date: 2013     Years since quittin.9     Smokeless tobacco: Former     Quit date: 2013   Substance Use Topics     Alcohol use: Yes     Comment: occ             2023     3:38 PM   Alcohol Use   Prescreen: >3 drinks/day or >7 drinks/week? No     Reviewed orders with patient.  Reviewed health maintenance and updated orders accordingly - Yes  BP Readings from Last 3 Encounters:   23 120/60   22 120/72   21 102/60    Wt Readings from Last 3 Encounters:   23 65.8 kg (145 lb)   22 69.1 kg (152 lb 6.4 oz)   21 72.1 kg (159 lb)                  Patient Active Problem List   Diagnosis     Disease of jaw     HORMONAL REPLACEMT POSTMENOPAUSAL     Tobacco use disorder     CARDIOVASCULAR SCREENING; LDL GOAL LESS THAN 160     Chronic pain     Osteoarthritis     Arthralgia     Advanced directives, counseling/discussion     Hyperplastic colon polyp     Primary osteoarthritis involving multiple joints     Meningioma (H)     Memory loss     Benign hypertension     Past Surgical History:   Procedure Laterality Date     HYSTERECTOMY, PAP NO LONGER INDICATED       SURGICAL HISTORY OF -       lap tubal ligation      SURGICAL  HISTORY OF -       sinus surgery     SURGICAL HISTORY OF -       TM joint surgery      SURGICAL HISTORY OF -       total vaginal hysterectomy unilateral oophorectomy     SURGICAL HISTORY OF -       salpingectomy - ectopic pregnancy      SURGICAL HISTORY OF -       tonsillectomy       Social History     Tobacco Use     Smoking status: Former     Packs/day: 0.50     Years: 20.00     Pack years: 10.00     Types: Cigarettes     Quit date: 2013     Years since quittin.9     Smokeless tobacco: Former     Quit date: 2013   Substance Use Topics     Alcohol use: Yes     Comment: occ     Family History   Problem Relation Age of Onset     Blood Disease Mother         brain damage from encephalitis     Parkinsonism Mother      Diabetes Father         diet controlled     Cardiovascular Father         stent     Parkinsonism Father         demetia form not shaky     Diabetes Brother         juvenille      Diabetes Brother      Diabetes Brother          Current Outpatient Medications   Medication Sig Dispense Refill     diphenhydrAMINE (BENADRYL) 25 MG capsule Take 25 mg by mouth every 6 hours as needed for itching or allergies       EPINEPHrine (ANY BX GENERIC EQUIV) 0.3 MG/0.3ML injection 2-pack Inject 0.3 mLs (0.3 mg) into the muscle once as needed for anaphylaxis 0.3 mL 3     MULTIVITAMINS PO TABS Reported on 2017  3     PARoxetine (PAXIL) 20 MG tablet Take 1 tablet (20 mg) by mouth At Bedtime 90 tablet 3     TURMERIC PO  (Patient not taking: Reported on 2023)       Allergies   Allergen Reactions     Bee      Throat closes     Morphine Nausea and Vomiting     Recent Labs   Lab Test 21  0918 20  0703 17  0850 17  0855 06/29/15  1005   *  --   --   --  128   HDL 58  --   --   --  66   TRIG 190*  --   --   --  158*   ALT  --   --   --   --  26   CR 0.74 0.79   < >  --  0.73   GFRESTIMATED 88 82   < >  --  83   GFRESTBLACK >90 >90   < >  --  >90   GFR  Calc     POTASSIUM 3.7 3.4   < >  --  3.9   TSH  --   --   --  1.15  --     < > = values in this interval not displayed.        Breast Cancer Screenin/28/2023     3:38 PM   Breast CA Risk Assessment (FHS-7)   Do you have a family history of breast, colon, or ovarian cancer? No / Unknown         Mammogram Screening: Recommended mammography every 1-2 years with patient discussion and risk factor consideration  Pertinent mammograms are reviewed under the imaging tab.    History of abnormal Pap smear: NO, hysterectomy, no pap needed      2007    12:00 AM   PAP / HPV   PAP (Historical) NIL      Reviewed and updated as needed this visit by clinical staff   Tobacco   Meds              Reviewed and updated as needed this visit by Provider                 Past Medical History:   Diagnosis Date     Allergic reaction to bee sting      Primary osteoarthritis involving multiple joints      Temporomandibular joint disorders, unspecified       Past Surgical History:   Procedure Laterality Date     HYSTERECTOMY, PAP NO LONGER INDICATED       SURGICAL HISTORY OF -       lap tubal ligation      SURGICAL HISTORY OF -       sinus surgery     SURGICAL HISTORY OF -       TM joint surgery      SURGICAL HISTORY OF -       total vaginal hysterectomy unilateral oophorectomy     SURGICAL HISTORY OF -       salpingectomy - ectopic pregnancy      SURGICAL HISTORY OF -       tonsillectomy       Review of Systems   Constitutional: Negative for chills and fever.   HENT: Negative for congestion, ear pain, hearing loss and sore throat.    Eyes: Negative for pain and visual disturbance.   Respiratory: Negative for cough and shortness of breath.    Cardiovascular: Negative for chest pain, palpitations and peripheral edema.   Gastrointestinal: Negative for abdominal pain, constipation, diarrhea, heartburn, hematochezia and nausea.   Breasts:  Negative for tenderness, breast mass and discharge.   Genitourinary: Positive  "for frequency. Negative for dysuria, genital sores, pelvic pain, urgency, vaginal bleeding and vaginal discharge.   Musculoskeletal: Positive for arthralgias and joint swelling. Negative for myalgias.   Skin: Negative for rash.   Neurological: Negative for dizziness, weakness, headaches and paresthesias.   Psychiatric/Behavioral: Negative for mood changes. The patient is not nervous/anxious.           OBJECTIVE:   /60   Pulse 78   Temp 96.8  F (36  C)   Resp 16   Ht 1.689 m (5' 6.5\")   Wt 65.8 kg (145 lb)   LMP 08/16/2003   SpO2 99%   BMI 23.05 kg/m    Physical Exam  GENERAL: healthy, alert and no distress  HENT: ear canals and TM's normal, pharynx without erythema  NECK: no adenopathy, no asymmetry  RESP: lungs clear to auscultation - no rales, rhonchi or wheezes  CV: regular rate and rhythm, normal S1 S2, no S3 or S4, no murmur  ABDOMEN: soft, nontender, no hepatosplenomegaly, no masses and bowel sounds normal  MS: no gross musculoskeletal defects noted      Diagnostic Test Results:  Labs reviewed in Epic  No results found for this or any previous visit (from the past 24 hour(s)).    ASSESSMENT/PLAN:       ICD-10-CM    1. Routine general medical examination at a health care facility  Z00.00       2. Depression, unspecified depression type  F32.A PARoxetine (PAXIL) 20 MG tablet      3. Bee sting allergy  Z91.030 EPINEPHrine (ANY BX GENERIC EQUIV) 0.3 MG/0.3ML injection 2-pack      4. Encounter for screening mammogram for breast cancer  Z12.31 *MA Screening Digital Bilateral          Depression is reportedly going great, continue with Paxil at 20 mg daily and monitoring of symptoms.  Labs pending.  Epi pen refilled.  Will schedule mammogram this year.  Due for colonoscopy in 2026.        COUNSELING:  Reviewed preventive health counseling, as reflected in patient instructions       Regular exercise       Healthy diet/nutrition        She reports that she quit smoking about 9 years ago. Her smoking use " included cigarettes. She has a 10.00 pack-year smoking history. She quit smokeless tobacco use about 10 years ago.      LIGIA Lee CNP  [facility]  Answers for HPI/ROS submitted by the patient on 6/28/2023  If you checked off any problems, how difficult have these problems made it for you to do your work, take care of things at home, or get along with other people?: Not difficult at all  PHQ9 TOTAL SCORE: 0

## 2024-01-07 ENCOUNTER — HEALTH MAINTENANCE LETTER (OUTPATIENT)
Age: 64
End: 2024-01-07

## 2024-02-06 ENCOUNTER — TELEPHONE (OUTPATIENT)
Dept: FAMILY MEDICINE | Facility: CLINIC | Age: 64
End: 2024-02-06
Payer: COMMERCIAL

## 2024-02-06 NOTE — TELEPHONE ENCOUNTER
Patient Quality Outreach    Patient is due for the following:   Breast Cancer Screening - Mammogram    Next Steps:   Mammogram     Type of outreach:    Sent [employer] message.      Questions for provider review:    None           Catarina Eli CMA

## 2024-02-06 NOTE — LETTER
February 6, 2024    To  Arti Bui  [address]  [place] [facility] [place] [address]    Your team at [facility] cares about your health. We have reviewed your chart and based on our findings; we are making the following recommendations to better manage your health.     You are in particular need of attention regarding the following:     Schedule Annual MAMMOGRAPHY. [facility] scheduling number is [phone] or schedule in [facility] (self referral).    If you have already completed these items, please contact the clinic via phone or   [facility] so your care team can review and update your records. Thank you for   choosing [facility] for your healthcare needs. For any questions,   concerns, or to schedule an appointment please contact our clinic.    Healthy Regards,      Your [facility] Care Team

## 2024-03-19 ENCOUNTER — TRANSFERRED RECORDS (OUTPATIENT)
Dept: HEALTH INFORMATION MANAGEMENT | Facility: CLINIC | Age: 64
End: 2024-03-19
Payer: COMMERCIAL

## 2024-06-04 ENCOUNTER — OFFICE VISIT (OUTPATIENT)
Dept: PEDIATRICS | Facility: CLINIC | Age: 64
End: 2024-06-04
Payer: COMMERCIAL

## 2024-06-04 ENCOUNTER — ANESTHESIA (OUTPATIENT)
Dept: SURGERY | Facility: HOSPITAL | Age: 64
End: 2024-06-04
Payer: COMMERCIAL

## 2024-06-04 ENCOUNTER — ANESTHESIA EVENT (OUTPATIENT)
Dept: SURGERY | Facility: HOSPITAL | Age: 64
End: 2024-06-04
Payer: COMMERCIAL

## 2024-06-04 ENCOUNTER — NURSE TRIAGE (OUTPATIENT)
Dept: FAMILY MEDICINE | Facility: CLINIC | Age: 64
End: 2024-06-04

## 2024-06-04 ENCOUNTER — HOSPITAL ENCOUNTER (OUTPATIENT)
Dept: CT IMAGING | Facility: CLINIC | Age: 64
Discharge: HOME | End: 2024-06-04
Payer: COMMERCIAL

## 2024-06-04 ENCOUNTER — HOSPITAL ENCOUNTER (OUTPATIENT)
Facility: HOSPITAL | Age: 64
Discharge: HOME | End: 2024-06-06
Attending: SPECIALIST | Admitting: SPECIALIST
Payer: COMMERCIAL

## 2024-06-04 VITALS
TEMPERATURE: 99 F | HEIGHT: 66 IN | HEART RATE: 77 BPM | SYSTOLIC BLOOD PRESSURE: 103 MMHG | BODY MASS INDEX: 23.21 KG/M2 | DIASTOLIC BLOOD PRESSURE: 68 MMHG | WEIGHT: 144.4 LBS | RESPIRATION RATE: 14 BRPM | OXYGEN SATURATION: 99 %

## 2024-06-04 DIAGNOSIS — R19.7 DIARRHEA OF PRESUMED INFECTIOUS ORIGIN: ICD-10-CM

## 2024-06-04 DIAGNOSIS — E86.9 VOLUME DEPLETION, GASTROINTESTINAL LOSS: ICD-10-CM

## 2024-06-04 DIAGNOSIS — K35.32 ACUTE APPENDICITIS WITH PERFORATION AND LOCALIZED PERITONITIS, WITHOUT ABSCESS OR GANGRENE: ICD-10-CM

## 2024-06-04 DIAGNOSIS — K35.32 PERFORATED APPENDIX: Primary | ICD-10-CM

## 2024-06-04 DIAGNOSIS — K35.32 RUPTURED APPENDICITIS: Primary | ICD-10-CM

## 2024-06-04 DIAGNOSIS — K35.211 ACUTE APPENDICITIS WITH PERFORATION, GENERALIZED PERITONITIS, AND ABSCESS, WITHOUT GANGRENE: ICD-10-CM

## 2024-06-04 DIAGNOSIS — R10.31 ABDOMINAL PAIN, ACUTE, RIGHT LOWER QUADRANT: Primary | ICD-10-CM

## 2024-06-04 DIAGNOSIS — R50.9 FEVER, UNSPECIFIED FEVER CAUSE: ICD-10-CM

## 2024-06-04 DIAGNOSIS — R10.31 ABDOMINAL PAIN, ACUTE, RIGHT LOWER QUADRANT: ICD-10-CM

## 2024-06-04 LAB
ALBUMIN SERPL BCG-MCNC: 3.9 G/DL (ref 3.5–5.2)
ALP SERPL-CCNC: 51 U/L (ref 40–150)
ALT SERPL W P-5'-P-CCNC: 37 U/L (ref 0–50)
ANION GAP SERPL CALCULATED.3IONS-SCNC: 16 MMOL/L (ref 7–15)
AST SERPL W P-5'-P-CCNC: 32 U/L (ref 0–45)
BILIRUB SERPL-MCNC: 0.6 MG/DL
BUN SERPL-MCNC: 11.6 MG/DL (ref 8–23)
CALCIUM SERPL-MCNC: 9.6 MG/DL (ref 8.8–10.2)
CHLORIDE SERPL-SCNC: 95 MMOL/L (ref 98–107)
CREAT SERPL-MCNC: 0.72 MG/DL (ref 0.51–0.95)
DEPRECATED HCO3 PLAS-SCNC: 27 MMOL/L (ref 22–29)
EGFRCR SERPLBLD CKD-EPI 2021: >90 ML/MIN/1.73M2
ERYTHROCYTE [DISTWIDTH] IN BLOOD BY AUTOMATED COUNT: 12.6 % (ref 10–15)
GLUCOSE SERPL-MCNC: 94 MG/DL (ref 70–99)
HCT VFR BLD AUTO: 37.6 % (ref 35–47)
HGB BLD-MCNC: 12.6 G/DL (ref 11.7–15.7)
LIPASE SERPL-CCNC: 25 U/L (ref 13–60)
MAGNESIUM SERPL-MCNC: 2.4 MG/DL (ref 1.7–2.3)
MCH RBC QN AUTO: 31.2 PG (ref 26.5–33)
MCHC RBC AUTO-ENTMCNC: 33.5 G/DL (ref 31.5–36.5)
MCV RBC AUTO: 93 FL (ref 78–100)
PLATELET # BLD AUTO: 236 10E3/UL (ref 150–450)
POTASSIUM SERPL-SCNC: 3.2 MMOL/L (ref 3.4–5.3)
POTASSIUM SERPL-SCNC: 3.3 MMOL/L (ref 3.4–5.3)
PROT SERPL-MCNC: 7.1 G/DL (ref 6.4–8.3)
RADIOLOGIST FLAGS: ABNORMAL
RADIOLOGIST FLAGS: ABNORMAL
RBC # BLD AUTO: 4.04 10E6/UL (ref 3.8–5.2)
SODIUM SERPL-SCNC: 138 MMOL/L (ref 135–145)
WBC # BLD AUTO: 12.4 10E3/UL (ref 4–11)

## 2024-06-04 PROCEDURE — 999N000141 HC STATISTIC PRE-PROCEDURE NURSING ASSESSMENT: Performed by: SPECIALIST

## 2024-06-04 PROCEDURE — 258N000003 HC RX IP 258 OP 636: Performed by: ANESTHESIOLOGY

## 2024-06-04 PROCEDURE — 250N000013 HC RX MED GY IP 250 OP 250 PS 637: Performed by: SPECIALIST

## 2024-06-04 PROCEDURE — 370N000017 HC ANESTHESIA TECHNICAL FEE, PER MIN: Performed by: SPECIALIST

## 2024-06-04 PROCEDURE — 44970 LAPAROSCOPY APPENDECTOMY: CPT | Mod: AS

## 2024-06-04 PROCEDURE — 88304 TISSUE EXAM BY PATHOLOGIST: CPT | Mod: TC | Performed by: SPECIALIST

## 2024-06-04 PROCEDURE — 99204 OFFICE O/P NEW MOD 45 MIN: CPT | Mod: 57 | Performed by: SPECIALIST

## 2024-06-04 PROCEDURE — 99215 OFFICE O/P EST HI 40 MIN: CPT | Mod: 25

## 2024-06-04 PROCEDURE — 44970 LAPAROSCOPY APPENDECTOMY: CPT | Performed by: SPECIALIST

## 2024-06-04 PROCEDURE — 83735 ASSAY OF MAGNESIUM: CPT | Performed by: INTERNAL MEDICINE

## 2024-06-04 PROCEDURE — 88304 TISSUE EXAM BY PATHOLOGIST: CPT | Mod: 26 | Performed by: PATHOLOGY

## 2024-06-04 PROCEDURE — 710N000009 HC RECOVERY PHASE 1, LEVEL 1, PER MIN: Performed by: SPECIALIST

## 2024-06-04 PROCEDURE — 96361 HYDRATE IV INFUSION ADD-ON: CPT

## 2024-06-04 PROCEDURE — 83690 ASSAY OF LIPASE: CPT

## 2024-06-04 PROCEDURE — 250N000011 HC RX IP 250 OP 636: Performed by: SPECIALIST

## 2024-06-04 PROCEDURE — 96374 THER/PROPH/DIAG INJ IV PUSH: CPT

## 2024-06-04 PROCEDURE — 250N000011 HC RX IP 250 OP 636: Performed by: ANESTHESIOLOGY

## 2024-06-04 PROCEDURE — 360N000076 HC SURGERY LEVEL 3, PER MIN: Performed by: SPECIALIST

## 2024-06-04 PROCEDURE — 250N000009 HC RX 250: Performed by: ANESTHESIOLOGY

## 2024-06-04 PROCEDURE — 96375 TX/PRO/DX INJ NEW DRUG ADDON: CPT

## 2024-06-04 PROCEDURE — 250N000011 HC RX IP 250 OP 636

## 2024-06-04 PROCEDURE — 258N000003 HC RX IP 258 OP 636: Performed by: SPECIALIST

## 2024-06-04 PROCEDURE — 85027 COMPLETE CBC AUTOMATED: CPT

## 2024-06-04 PROCEDURE — 84132 ASSAY OF SERUM POTASSIUM: CPT | Performed by: INTERNAL MEDICINE

## 2024-06-04 PROCEDURE — 74177 CT ABD & PELVIS W/CONTRAST: CPT

## 2024-06-04 PROCEDURE — 80053 COMPREHEN METABOLIC PANEL: CPT

## 2024-06-04 PROCEDURE — 272N000001 HC OR GENERAL SUPPLY STERILE: Performed by: SPECIALIST

## 2024-06-04 PROCEDURE — 99417 PROLNG OP E/M EACH 15 MIN: CPT

## 2024-06-04 PROCEDURE — 250N000009 HC RX 250

## 2024-06-04 PROCEDURE — 36415 COLL VENOUS BLD VENIPUNCTURE: CPT | Performed by: INTERNAL MEDICINE

## 2024-06-04 PROCEDURE — 36415 COLL VENOUS BLD VENIPUNCTURE: CPT

## 2024-06-04 PROCEDURE — 250N000025 HC SEVOFLURANE, PER MIN: Performed by: SPECIALIST

## 2024-06-04 RX ORDER — ONDANSETRON 4 MG/1
4 TABLET, ORALLY DISINTEGRATING ORAL EVERY 30 MIN PRN
Status: DISCONTINUED | OUTPATIENT
Start: 2024-06-04 | End: 2024-06-04 | Stop reason: HOSPADM

## 2024-06-04 RX ORDER — NALOXONE HYDROCHLORIDE 0.4 MG/ML
0.4 INJECTION, SOLUTION INTRAMUSCULAR; INTRAVENOUS; SUBCUTANEOUS
Status: DISCONTINUED | OUTPATIENT
Start: 2024-06-04 | End: 2024-06-06

## 2024-06-04 RX ORDER — AMOXICILLIN 250 MG
1 CAPSULE ORAL 2 TIMES DAILY
Status: DISCONTINUED | OUTPATIENT
Start: 2024-06-04 | End: 2024-06-06 | Stop reason: HOSPADM

## 2024-06-04 RX ORDER — POTASSIUM CHLORIDE 1500 MG/1
40 TABLET, EXTENDED RELEASE ORAL ONCE
Status: COMPLETED | OUTPATIENT
Start: 2024-06-04 | End: 2024-06-04

## 2024-06-04 RX ORDER — FENTANYL CITRATE 50 UG/ML
INJECTION, SOLUTION INTRAMUSCULAR; INTRAVENOUS PRN
Status: DISCONTINUED | OUTPATIENT
Start: 2024-06-04 | End: 2024-06-04

## 2024-06-04 RX ORDER — KETOROLAC TROMETHAMINE 30 MG/ML
30 INJECTION, SOLUTION INTRAMUSCULAR; INTRAVENOUS ONCE
Status: DISCONTINUED | OUTPATIENT
Start: 2024-06-04 | End: 2024-06-04

## 2024-06-04 RX ORDER — BISACODYL 10 MG
10 SUPPOSITORY, RECTAL RECTAL DAILY PRN
Status: DISCONTINUED | OUTPATIENT
Start: 2024-06-07 | End: 2024-06-06 | Stop reason: HOSPADM

## 2024-06-04 RX ORDER — NALOXONE HYDROCHLORIDE 0.4 MG/ML
0.2 INJECTION, SOLUTION INTRAMUSCULAR; INTRAVENOUS; SUBCUTANEOUS
Status: DISCONTINUED | OUTPATIENT
Start: 2024-06-04 | End: 2024-06-06

## 2024-06-04 RX ORDER — NALOXONE HYDROCHLORIDE 0.4 MG/ML
0.1 INJECTION, SOLUTION INTRAMUSCULAR; INTRAVENOUS; SUBCUTANEOUS
Status: DISCONTINUED | OUTPATIENT
Start: 2024-06-04 | End: 2024-06-04 | Stop reason: HOSPADM

## 2024-06-04 RX ORDER — OXYCODONE HYDROCHLORIDE 5 MG/1
10 TABLET ORAL
Status: DISCONTINUED | OUTPATIENT
Start: 2024-06-04 | End: 2024-06-04 | Stop reason: HOSPADM

## 2024-06-04 RX ORDER — SODIUM CHLORIDE, SODIUM LACTATE, POTASSIUM CHLORIDE, CALCIUM CHLORIDE 600; 310; 30; 20 MG/100ML; MG/100ML; MG/100ML; MG/100ML
INJECTION, SOLUTION INTRAVENOUS CONTINUOUS
Status: DISCONTINUED | OUTPATIENT
Start: 2024-06-04 | End: 2024-06-04 | Stop reason: HOSPADM

## 2024-06-04 RX ORDER — HYDROMORPHONE HCL IN WATER/PF 6 MG/30 ML
0.2 PATIENT CONTROLLED ANALGESIA SYRINGE INTRAVENOUS EVERY 5 MIN PRN
Status: DISCONTINUED | OUTPATIENT
Start: 2024-06-04 | End: 2024-06-04 | Stop reason: HOSPADM

## 2024-06-04 RX ORDER — MAGNESIUM SULFATE 4 G/50ML
4 INJECTION INTRAVENOUS ONCE
Status: COMPLETED | OUTPATIENT
Start: 2024-06-04 | End: 2024-06-04

## 2024-06-04 RX ORDER — ONDANSETRON 2 MG/ML
4 INJECTION INTRAMUSCULAR; INTRAVENOUS EVERY 30 MIN PRN
Status: DISCONTINUED | OUTPATIENT
Start: 2024-06-04 | End: 2024-06-04 | Stop reason: HOSPADM

## 2024-06-04 RX ORDER — IOPAMIDOL 755 MG/ML
71 INJECTION, SOLUTION INTRAVASCULAR ONCE
Status: COMPLETED | OUTPATIENT
Start: 2024-06-04 | End: 2024-06-04

## 2024-06-04 RX ORDER — ACETAMINOPHEN 325 MG/1
975 TABLET ORAL ONCE
Status: CANCELLED | OUTPATIENT
Start: 2024-06-04 | End: 2024-06-04

## 2024-06-04 RX ORDER — ONDANSETRON 2 MG/ML
4 INJECTION INTRAMUSCULAR; INTRAVENOUS EVERY 6 HOURS PRN
Status: DISCONTINUED | OUTPATIENT
Start: 2024-06-04 | End: 2024-06-06 | Stop reason: HOSPADM

## 2024-06-04 RX ORDER — LIDOCAINE 40 MG/G
CREAM TOPICAL
Status: DISCONTINUED | OUTPATIENT
Start: 2024-06-04 | End: 2024-06-06 | Stop reason: HOSPADM

## 2024-06-04 RX ORDER — HYDROCODONE BITARTRATE AND ACETAMINOPHEN 5; 325 MG/1; MG/1
1 TABLET ORAL EVERY 6 HOURS PRN
Status: DISCONTINUED | OUTPATIENT
Start: 2024-06-04 | End: 2024-06-06 | Stop reason: HOSPADM

## 2024-06-04 RX ORDER — DIPHENHYDRAMINE HCL 25 MG
25 CAPSULE ORAL EVERY 6 HOURS PRN
Status: DISCONTINUED | OUTPATIENT
Start: 2024-06-04 | End: 2024-06-06 | Stop reason: HOSPADM

## 2024-06-04 RX ORDER — LIDOCAINE 40 MG/G
CREAM TOPICAL
Status: DISCONTINUED | OUTPATIENT
Start: 2024-06-04 | End: 2024-06-04 | Stop reason: HOSPADM

## 2024-06-04 RX ORDER — ONDANSETRON 4 MG/1
4 TABLET, ORALLY DISINTEGRATING ORAL EVERY 6 HOURS PRN
Status: DISCONTINUED | OUTPATIENT
Start: 2024-06-04 | End: 2024-06-06 | Stop reason: HOSPADM

## 2024-06-04 RX ORDER — PIPERACILLIN SODIUM, TAZOBACTAM SODIUM 3; .375 G/15ML; G/15ML
3.38 INJECTION, POWDER, LYOPHILIZED, FOR SOLUTION INTRAVENOUS EVERY 8 HOURS
Status: DISCONTINUED | OUTPATIENT
Start: 2024-06-05 | End: 2024-06-06 | Stop reason: HOSPADM

## 2024-06-04 RX ORDER — PAROXETINE 20 MG/1
20 TABLET, FILM COATED ORAL EVERY MORNING
COMMUNITY
End: 2024-06-27

## 2024-06-04 RX ORDER — PIPERACILLIN SODIUM, TAZOBACTAM SODIUM 3; .375 G/15ML; G/15ML
3.38 INJECTION, POWDER, LYOPHILIZED, FOR SOLUTION INTRAVENOUS ONCE
Qty: 15 ML | Refills: 0 | Status: COMPLETED | OUTPATIENT
Start: 2024-06-04 | End: 2024-06-04

## 2024-06-04 RX ORDER — ACETAMINOPHEN 325 MG/1
650 TABLET ORAL EVERY 4 HOURS PRN
Status: DISCONTINUED | OUTPATIENT
Start: 2024-06-07 | End: 2024-06-06 | Stop reason: HOSPADM

## 2024-06-04 RX ORDER — FENTANYL CITRATE 50 UG/ML
50 INJECTION, SOLUTION INTRAMUSCULAR; INTRAVENOUS EVERY 5 MIN PRN
Status: DISCONTINUED | OUTPATIENT
Start: 2024-06-04 | End: 2024-06-04 | Stop reason: HOSPADM

## 2024-06-04 RX ORDER — NALOXONE HYDROCHLORIDE 0.4 MG/ML
0.4 INJECTION, SOLUTION INTRAMUSCULAR; INTRAVENOUS; SUBCUTANEOUS
Status: DISCONTINUED | OUTPATIENT
Start: 2024-06-04 | End: 2024-06-06 | Stop reason: HOSPADM

## 2024-06-04 RX ORDER — POTASSIUM CHLORIDE 1500 MG/1
40 TABLET, EXTENDED RELEASE ORAL ONCE
Qty: 2 TABLET | Refills: 0 | Status: COMPLETED | OUTPATIENT
Start: 2024-06-04 | End: 2024-06-04

## 2024-06-04 RX ORDER — LIDOCAINE HYDROCHLORIDE 10 MG/ML
INJECTION, SOLUTION INFILTRATION; PERINEURAL PRN
Status: DISCONTINUED | OUTPATIENT
Start: 2024-06-04 | End: 2024-06-04

## 2024-06-04 RX ORDER — IBUPROFEN 200 MG
600 TABLET ORAL EVERY 6 HOURS PRN
Status: DISCONTINUED | OUTPATIENT
Start: 2024-06-04 | End: 2024-06-06 | Stop reason: HOSPADM

## 2024-06-04 RX ORDER — OXYCODONE HYDROCHLORIDE 5 MG/1
5 TABLET ORAL
Status: DISCONTINUED | OUTPATIENT
Start: 2024-06-04 | End: 2024-06-04 | Stop reason: HOSPADM

## 2024-06-04 RX ORDER — DEXAMETHASONE SODIUM PHOSPHATE 4 MG/ML
4 INJECTION, SOLUTION INTRA-ARTICULAR; INTRALESIONAL; INTRAMUSCULAR; INTRAVENOUS; SOFT TISSUE
Status: DISCONTINUED | OUTPATIENT
Start: 2024-06-04 | End: 2024-06-04 | Stop reason: HOSPADM

## 2024-06-04 RX ORDER — NALOXONE HYDROCHLORIDE 0.4 MG/ML
0.2 INJECTION, SOLUTION INTRAMUSCULAR; INTRAVENOUS; SUBCUTANEOUS
Status: DISCONTINUED | OUTPATIENT
Start: 2024-06-04 | End: 2024-06-06 | Stop reason: HOSPADM

## 2024-06-04 RX ORDER — CEFAZOLIN SODIUM/WATER 2 G/20 ML
2 SYRINGE (ML) INTRAVENOUS
Status: COMPLETED | OUTPATIENT
Start: 2024-06-04 | End: 2024-06-04

## 2024-06-04 RX ORDER — PAROXETINE 20 MG/1
20 TABLET, FILM COATED ORAL DAILY
Status: DISCONTINUED | OUTPATIENT
Start: 2024-06-05 | End: 2024-06-06 | Stop reason: HOSPADM

## 2024-06-04 RX ORDER — HYDROMORPHONE HCL IN WATER/PF 6 MG/30 ML
0.2 PATIENT CONTROLLED ANALGESIA SYRINGE INTRAVENOUS
Status: DISCONTINUED | OUTPATIENT
Start: 2024-06-04 | End: 2024-06-06 | Stop reason: HOSPADM

## 2024-06-04 RX ORDER — BUPIVACAINE HYDROCHLORIDE 2.5 MG/ML
INJECTION, SOLUTION INFILTRATION; PERINEURAL PRN
Status: DISCONTINUED | OUTPATIENT
Start: 2024-06-04 | End: 2024-06-04 | Stop reason: HOSPADM

## 2024-06-04 RX ORDER — ACETAMINOPHEN 325 MG/1
975 TABLET ORAL EVERY 8 HOURS
Qty: 27 TABLET | Refills: 0 | Status: DISCONTINUED | OUTPATIENT
Start: 2024-06-04 | End: 2024-06-06 | Stop reason: HOSPADM

## 2024-06-04 RX ORDER — POLYETHYLENE GLYCOL 3350 17 G/17G
17 POWDER, FOR SOLUTION ORAL DAILY
Status: DISCONTINUED | OUTPATIENT
Start: 2024-06-05 | End: 2024-06-06 | Stop reason: HOSPADM

## 2024-06-04 RX ORDER — DEXAMETHASONE SODIUM PHOSPHATE 10 MG/ML
INJECTION, SOLUTION INTRAMUSCULAR; INTRAVENOUS PRN
Status: DISCONTINUED | OUTPATIENT
Start: 2024-06-04 | End: 2024-06-04

## 2024-06-04 RX ORDER — DEXTROSE MONOHYDRATE, SODIUM CHLORIDE, AND POTASSIUM CHLORIDE 50; 1.49; 4.5 G/1000ML; G/1000ML; G/1000ML
INJECTION, SOLUTION INTRAVENOUS CONTINUOUS
Status: DISCONTINUED | OUTPATIENT
Start: 2024-06-04 | End: 2024-06-06 | Stop reason: HOSPADM

## 2024-06-04 RX ORDER — ACETAMINOPHEN 325 MG/1
975 TABLET ORAL ONCE
Status: DISCONTINUED | OUTPATIENT
Start: 2024-06-04 | End: 2024-06-04

## 2024-06-04 RX ORDER — CEFTRIAXONE 1 G/1
1 INJECTION, POWDER, FOR SOLUTION INTRAMUSCULAR; INTRAVENOUS ONCE
Status: DISCONTINUED | OUTPATIENT
Start: 2024-06-04 | End: 2024-06-04

## 2024-06-04 RX ORDER — FENTANYL CITRATE 50 UG/ML
25 INJECTION, SOLUTION INTRAMUSCULAR; INTRAVENOUS EVERY 5 MIN PRN
Status: DISCONTINUED | OUTPATIENT
Start: 2024-06-04 | End: 2024-06-04 | Stop reason: HOSPADM

## 2024-06-04 RX ORDER — HYDROMORPHONE HCL IN WATER/PF 6 MG/30 ML
0.4 PATIENT CONTROLLED ANALGESIA SYRINGE INTRAVENOUS
Status: DISCONTINUED | OUTPATIENT
Start: 2024-06-04 | End: 2024-06-06 | Stop reason: HOSPADM

## 2024-06-04 RX ORDER — EPHEDRINE SULFATE 50 MG/ML
INJECTION, SOLUTION INTRAMUSCULAR; INTRAVENOUS; SUBCUTANEOUS PRN
Status: DISCONTINUED | OUTPATIENT
Start: 2024-06-04 | End: 2024-06-04

## 2024-06-04 RX ORDER — PROCHLORPERAZINE MALEATE 10 MG
10 TABLET ORAL EVERY 6 HOURS PRN
Status: DISCONTINUED | OUTPATIENT
Start: 2024-06-04 | End: 2024-06-06 | Stop reason: HOSPADM

## 2024-06-04 RX ORDER — PROPOFOL 10 MG/ML
INJECTION, EMULSION INTRAVENOUS PRN
Status: DISCONTINUED | OUTPATIENT
Start: 2024-06-04 | End: 2024-06-04

## 2024-06-04 RX ORDER — HYDROMORPHONE HCL IN WATER/PF 6 MG/30 ML
0.4 PATIENT CONTROLLED ANALGESIA SYRINGE INTRAVENOUS EVERY 5 MIN PRN
Status: DISCONTINUED | OUTPATIENT
Start: 2024-06-04 | End: 2024-06-04 | Stop reason: HOSPADM

## 2024-06-04 RX ORDER — ACETAMINOPHEN 325 MG/1
975 TABLET ORAL ONCE
Status: COMPLETED | OUTPATIENT
Start: 2024-06-04 | End: 2024-06-04

## 2024-06-04 RX ADMIN — IBUPROFEN 600 MG: 200 TABLET, FILM COATED ORAL at 22:21

## 2024-06-04 RX ADMIN — PHENYLEPHRINE HYDROCHLORIDE 100 MCG: 10 INJECTION INTRAVENOUS at 15:16

## 2024-06-04 RX ADMIN — ACETAMINOPHEN 975 MG: 325 TABLET ORAL at 21:00

## 2024-06-04 RX ADMIN — Medication 5 MG: at 15:25

## 2024-06-04 RX ADMIN — PIPERACILLIN AND TAZOBACTAM 3.38 G: 3; .375 INJECTION, POWDER, FOR SOLUTION INTRAVENOUS at 18:05

## 2024-06-04 RX ADMIN — HYDROMORPHONE HYDROCHLORIDE 0.5 MG: 1 INJECTION, SOLUTION INTRAMUSCULAR; INTRAVENOUS; SUBCUTANEOUS at 16:05

## 2024-06-04 RX ADMIN — PHENYLEPHRINE HYDROCHLORIDE 100 MCG: 10 INJECTION INTRAVENOUS at 15:19

## 2024-06-04 RX ADMIN — LIDOCAINE HYDROCHLORIDE 3 MG: 10 INJECTION, SOLUTION INFILTRATION; PERINEURAL at 15:13

## 2024-06-04 RX ADMIN — Medication 5 MG: at 15:26

## 2024-06-04 RX ADMIN — IOPAMIDOL 71 ML: 755 INJECTION, SOLUTION INTRAVENOUS at 10:47

## 2024-06-04 RX ADMIN — POTASSIUM CHLORIDE, DEXTROSE MONOHYDRATE AND SODIUM CHLORIDE: 150; 5; 450 INJECTION, SOLUTION INTRAVENOUS at 18:40

## 2024-06-04 RX ADMIN — SODIUM CHLORIDE, POTASSIUM CHLORIDE, SODIUM LACTATE AND CALCIUM CHLORIDE: 600; 310; 30; 20 INJECTION, SOLUTION INTRAVENOUS at 14:40

## 2024-06-04 RX ADMIN — Medication 2 G: at 15:06

## 2024-06-04 RX ADMIN — HYDROCODONE BITARTRATE AND ACETAMINOPHEN 1 TABLET: 5; 325 TABLET ORAL at 19:39

## 2024-06-04 RX ADMIN — FENTANYL CITRATE 50 MCG: 50 INJECTION INTRAMUSCULAR; INTRAVENOUS at 15:13

## 2024-06-04 RX ADMIN — POTASSIUM CHLORIDE 40 MEQ: 1500 TABLET, EXTENDED RELEASE ORAL at 23:48

## 2024-06-04 RX ADMIN — PROPOFOL 150 MG: 10 INJECTION, EMULSION INTRAVENOUS at 15:13

## 2024-06-04 RX ADMIN — SENNOSIDES AND DOCUSATE SODIUM 1 TABLET: 8.6; 5 TABLET ORAL at 19:38

## 2024-06-04 RX ADMIN — FENTANYL CITRATE 50 MCG: 50 INJECTION INTRAMUSCULAR; INTRAVENOUS at 15:34

## 2024-06-04 RX ADMIN — PHENYLEPHRINE HYDROCHLORIDE 100 MCG: 10 INJECTION INTRAVENOUS at 15:25

## 2024-06-04 RX ADMIN — MIDAZOLAM 2 MG: 1 INJECTION INTRAMUSCULAR; INTRAVENOUS at 15:06

## 2024-06-04 RX ADMIN — HYDROMORPHONE HYDROCHLORIDE 0.5 MG: 1 INJECTION, SOLUTION INTRAMUSCULAR; INTRAVENOUS; SUBCUTANEOUS at 16:02

## 2024-06-04 RX ADMIN — CEFTRIAXONE 1 G: 1 INJECTION, POWDER, FOR SOLUTION INTRAMUSCULAR; INTRAVENOUS at 12:24

## 2024-06-04 RX ADMIN — PIPERACILLIN AND TAZOBACTAM 3.38 G: 3; .375 INJECTION, POWDER, FOR SOLUTION INTRAVENOUS at 23:58

## 2024-06-04 RX ADMIN — DEXAMETHASONE SODIUM PHOSPHATE 10 MG: 10 INJECTION, SOLUTION INTRAMUSCULAR; INTRAVENOUS at 15:17

## 2024-06-04 RX ADMIN — MAGNESIUM SULFATE HEPTAHYDRATE 4 G: 80 INJECTION, SOLUTION INTRAVENOUS at 14:41

## 2024-06-04 RX ADMIN — ACETAMINOPHEN 975 MG: 325 TABLET ORAL at 14:38

## 2024-06-04 RX ADMIN — ROCURONIUM BROMIDE 50 MG: 50 INJECTION, SOLUTION INTRAVENOUS at 15:13

## 2024-06-04 RX ADMIN — SODIUM CHLORIDE 58 ML: 9 INJECTION, SOLUTION INTRAVENOUS at 10:47

## 2024-06-04 RX ADMIN — SODIUM CHLORIDE, POTASSIUM CHLORIDE, SODIUM LACTATE AND CALCIUM CHLORIDE: 600; 310; 30; 20 INJECTION, SOLUTION INTRAVENOUS at 15:34

## 2024-06-04 RX ADMIN — FENTANYL CITRATE 25 MCG: 50 INJECTION, SOLUTION INTRAMUSCULAR; INTRAVENOUS at 16:37

## 2024-06-04 RX ADMIN — KETOROLAC TROMETHAMINE 30 MG: 30 INJECTION, SOLUTION INTRAMUSCULAR; INTRAVENOUS at 12:20

## 2024-06-04 RX ADMIN — POTASSIUM CHLORIDE 40 MEQ: 1500 TABLET, EXTENDED RELEASE ORAL at 22:22

## 2024-06-04 RX ADMIN — SUGAMMADEX 200 MG: 100 INJECTION, SOLUTION INTRAVENOUS at 15:58

## 2024-06-04 ASSESSMENT — PAIN SCALES - GENERAL: PAINLEVEL: MODERATE PAIN (5)

## 2024-06-04 ASSESSMENT — COLUMBIA-SUICIDE SEVERITY RATING SCALE - C-SSRS
2. HAVE YOU ACTUALLY HAD ANY THOUGHTS OF KILLING YOURSELF IN THE PAST MONTH?: NO
1. IN THE PAST MONTH, HAVE YOU WISHED YOU WERE DEAD OR WISHED YOU COULD GO TO SLEEP AND NOT WAKE UP?: NO
6. HAVE YOU EVER DONE ANYTHING, STARTED TO DO ANYTHING, OR PREPARED TO DO ANYTHING TO END YOUR LIFE?: NO

## 2024-06-04 ASSESSMENT — ACTIVITIES OF DAILY LIVING (ADL)
ADLS_ACUITY_SCORE: 21
ADLS_ACUITY_SCORE: 21
ADLS_ACUITY_SCORE: 18
ADLS_ACUITY_SCORE: 21
ADLS_ACUITY_SCORE: 21
ADLS_ACUITY_SCORE: 35
ADLS_ACUITY_SCORE: 33
ADLS_ACUITY_SCORE: 19
ADLS_ACUITY_SCORE: 21
ADLS_ACUITY_SCORE: 18

## 2024-06-04 NOTE — PHARMACY-ADMISSION MEDICATION HISTORY
Pharmacist Admission Medication History    Admission medication history is complete. The information provided in this note is only as accurate as the sources available at the time of the update.    Information Source(s): Patient via phone    Pertinent Information: adjusted paroxetine to morning dosing    Changes made to PTA medication list:  Added: None  Deleted: None  Changed: paroxetine    Allergies reviewed with patient and updates made in EHR: yes    Medication History Completed By: Danita Foley RPH 6/4/2024 5:35 PM    Current Facility-Administered Medications for the 6/4/24 encounter (Hospital Encounter)   Medication    [COMPLETED] cefTRIAXone (ROCEPHIN) in sterile water for IV administration 1 g    [COMPLETED] ketorolac (TORADOL) injection 30 mg    [COMPLETED] lactated ringers BOLUS 1,000 mL    [COMPLETED] lactated ringers BOLUS 1,000 mL    sodium chloride (PF) 0.9% PF flush 3 mL     PTA Med List   Medication Sig Last Dose    diphenhydrAMINE (BENADRYL) 25 MG capsule Take 25 mg by mouth every 6 hours as needed for itching or allergies More than a month    EPINEPHrine (ANY BX GENERIC EQUIV) 0.3 MG/0.3ML injection 2-pack Inject 0.3 mLs (0.3 mg) into the muscle once as needed for anaphylaxis More than a month    MULTIVITAMINS PO TABS Reported on 4/7/2017 6/4/2024 at 0700    PARoxetine (PAXIL) 20 MG tablet Take 20 mg by mouth every morning 6/4/2024 at am
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
## 2024-06-04 NOTE — Clinical Note
I had the pleasure of seeing Henri today in the [facility] ADS regarding her abdominal pain.  CT abdomen showed her to have a perforated appendix with abscess formation.  The surgeon on-call here today at [facility] declined to take Arti to OR and suggested that I transfer her (details are in my Epic note).  Arti is being transferred to [facility] where Dr. Nelson will see her in surgical consultation.  Thank you.

## 2024-06-04 NOTE — OP NOTE
Operative Note    Name:  Arti Bui  PCP:  Suzanne Stark  Procedure Date:  6/4/2024       Procedure:  Procedure(s):  APPENDECTOMY, LAPAROSCOPIC     Pre-Procedure Diagnosis:  Acute appendicitis with perforation and localized peritonitis, without abscess or gangrene [K35.32]     Post-Procedure Diagnosis:        Surgeon(s):  Catrachita Nelson MD     Assistant: Roland TPAIA      Anesthesia Type:  General       Findings:  Ruptured appendix with loculated abscess.    Operative Report:    The patient was brought to the operating suite where they were placed in the supine position and prepped and draped in a sterile fashion after general anesthesia was administered.  A small incision was made below the umbilicus and a Veress needle passed into the abdominal cavity which was insufflated with carbon dioxide.  A 5 mm trocar port was then placed followed by the camera.  Another 5 mm port was placed in the lower abdomen.   This point I could not really see the appendix yet but there was clearly evidence of inflammation in the deep right lower quadrant.  A 12 mm port was placed and using blunt dissection I began peeling off what was clearly the cecum and distal small bowel that was stuck up to the pelvic sidewall with primarily blunt dissection and entered a multiloculated abscess cavity.  This was evacuated.  I then was able to visualize the tip of the appendix.  The mesoappendix was taken down with at first electrocautery and then I switched to using the LigaSure device.  The base of the appendix was then amputated with the endoGIA stapler. .  The appendix was placed into an Endo Catch and pulled up through one of the  Port sites.  The port was replaced and the area irrigated until clear.  Because of the abscess a 15 round Steve drain was placed through a separate stab incision and placed along where the base of the appendix was and then all along the right pelvic region where the abscess had been.  This was  secured with a 2-0 silk.  All the ports removed, the fascia at the 12 mm site was closed with a figure-of-eight 0 Vicryl and each site closed with subcuticular sutures of 4 Monocryl.  Each site was infiltrated with quarter percent Marcaine.  Sterile dressings are placed.  The patient tolerated procedure well.  My assistant, Anabela TAPIA, assisted providing exposure and retraction throughout the case.    Estimated Blood Loss:   20cc    Specimens:    ID Type Source Tests Collected by Time Destination   1 : Appendix Tissue Appendix SURGICAL PATHOLOGY EXAM Catrachita Nelson MD 6/4/2024  3:45 PM            Drains:   Closed/Suction Drain 1 RLQ Bulb 15 Yi (Active)       Complications:    None    Catrachita Neslon MD     Date: 6/4/2024  Time: 4:06 PM

## 2024-06-04 NOTE — PROGRESS NOTES
"Acute and Diagnostic Services Clinic Visit    Assessment & Plan     (R10.31) Abdominal pain, acute, right lower quadrant  (primary encounter diagnosis)  Comment: Has had generalized abdominal pain for almost 2 weeks, though over the last few days the abdominal pain has centered around her right lower quadrant.  Pain is associated with nausea though now without vomiting, diarrhea, and intermittent fever, discussed below.  Exam is notable for the report of exquisite tenderness to palpation of all quadrants, worst in the right lower quadrant, with mild guarding only in the RLQ.  Abdominal exam is not acute.  Plan:   Comprehensive metabolic panel, CBC with         platelets, Lipase.  2.   Discussed information to be potentially gained from CT Abdomen Pelvis w Contrast.  Arti agrees to proceed.    (R19.7) Diarrhea of presumed infectious origin  Comment: Diarrhea has been present for nearly 2 weeks.  He continues to be \"violent\", watery, though without gross blood.  Diarrhea began during and [place] [facility], though Arti did not camp, and drink a trusted water supply (bottled water).  Her traveling companions are not ill.  Plan:   Enteric Bacteria and Virus Panel by [facility] Stool.    (R50.9) Fever, unspecified fever cause  Comment: Intermittent over the past week, Tmax 102.0.  Plan:  See workup of presumed GI source of fever above.    (E86.9) Volume depletion, gastrointestinal loss  Comment: Has had very little to eat or drink over the past week, as oral intake exacerbates diarrhea, and seems to exacerbate RLQ pain to some degree.  Plan:   Will assess renal function before she receives IV contrast for CT abdomen.    Lactated ringers BOLUS 1,000 mL, will try to get 2 L in during this [facility] visit.    DISCUSSION/MEDICAL DECISION MAKING:  I received a call from the interpreting radiologist, who tells me that the CT of the abdomen shows appendicitis with rupture and intrapelvic " abscess.    (K35.211) Acute appendicitis with perforation, peritonitis, and intrapelvic abscess  Comment: Discussed these findings with Arti; she expresses understanding.  Plan  1.  I will contact the admissions team looking for an inpatient bed here at [facility].  2.  Continue 0.9 NaCl at 999 mL/h for another liter (which will be liter #2).  3.  The only parenteral antibiotic we have available for use here in [facility] is ceftriaxone.  I will administer CTAX 1 g while waiting for hospital bed.  4.   Will give ketorolac 30 mg IV x 1 and every 6 hours as needed.  5.   Arti will contact her  to give him this news and advise him that she is going to be admitted.    Update (4517): I contacted Dr. Mcduffie, on-call for surgery here today at [facility], to inquire about admission to [facility] and surgical consultation.  Dr. Mcduffie reviewed the CT scan, and advised me that she does not think the patient needs an operation for this perforated appendix.  Instead, Dr. Mcduffie advises that I transfer Arti to a hospital in the [facility] that has interventional radiology available for consideration of percutaneous drainage.    I contacted the hospitalist on-call at [facility], Dr. Nevarez (I may have that spelling wrong) to discuss the possibility of transferring Arti to [facility] for care and interventional radiology consultation.  Dr. Nevarez asked that I contact the surgeon on-call today at [facility] to review this case with her.  I spoke to Dr. Nelson, on-call for general surgery, who reviewed the CT findings.  Dr. Nelson suggests that I transfer the patient to [facility] and she will perform surgical consultation and possibly take the patient to the OR this afternoon.    I contacted the transfer team, who tells me that there is a bed available for Arti on the P1 unit (phone [phone]).  I spoke to Arti regarding this change in plans, specifically the plan for transfer to [facility] and the possibility of OR today  for resection of the perforated appendix.  She readily agrees.  She would much prefer to go by private car.  I think that she is stable to do so: She is hemodynamically stable, we have gotten 2 L of fluid in her, a single dose of ceftriaxone, and her pain is fairly well-controlled after ketorolac 30 mg.  Therefore, her  is on his way to the hospital and will pick her up in the [place] [address], then drive her to [facility] for admission.    Update (4519): I received another call from the [place] transfer team.  Dr. Nelson asks that the patient go straight to preop upon arrival at [facility] for planned laparotomy.  I discussed this with Arti; she is in agreement.    78  minutes were spent doing chart review, history and exam, documentation and further activities per the note.    Subjective   Arti is a 64 year old, presenting for the following health issues:  Abdominal Pain (RLQ), Diarrhea, and Dehydration    HPI     Abdominal/Flank Pain  Onset/Duration: SX began Sunday    Description:   Character: Sharp  Location: right lower quadrant  Radiation: onr side to the other  Intensity: moderate, severe  Progression of Symptoms:  worsening and constant  Accompanying Signs & Symptoms:  Fever/chills: YES- fever 102  Gas/Bloating: YES- bloating  Nausea: YES  Vomitting: YES- since resolved  Diarrhea: YES  Constipation:no   Dysuria: YES           Hematuria: no            Frequency: no            Incontinence of urine: YES  History:            Last bowel movement: today  Trauma: no   Previous similar pain: YES- 3 years ago   Previous tests done: none           Previous Abdominal surgery: no   Precipitating factors:   Does the pain change with:     Food: no      Bowel Movement: YES    Urination: YES             Other factors: YES- movement  Therapies tried and outcome:  None    When food last eaten: yesterday 9 am    Diarrhea  Onset/Duration: SX began Sunday  Description:       Consistency of stool: watery       Blood in stool:  "No       Number of loose stools past 24 hours: 15-20  Progression of Symptoms: worsening  Accompanying signs and symptoms:       Fever: YES       Nausea/Vomiting: YES       Abdominal pain: YES       Weight loss: No       Episodes of constipation: No  History   Ill contacts: No  Recent use of antibiotics: No  Recent travels: YES- 3 week road trip [facility]  Recent medication-new or changes(Rx or OTC): No  Precipitating or alleviating factors: None  Therapies tried and outcome: candie Bui is a 64-year-old woman seen today in the [facility] Services ([facility]) clinic due to abdominal pain and diarrhea.    Arti has been ill for almost 2 weeks now.  Symptoms began either during or shortly following a road trip to the [facility] and  [facility].  She describes that as a \"road trip\".  They did not camp, and drank only from a trusted water supply (bottled water).  None of her traveling companions became ill.  Symptoms persisted and perhaps worsened during a visit to the family cabin after that road trip.    Arti has primarily had abdominal pain.  Abdominal pain was initially generalized, though more recently has located over the right lower quadrant.  Early in her illness, she had nausea and emesis, though emesis has improved, though nausea persists.  She has also developed diarrhea.  Diarrhea is described as being \"violent\" and its frequency and severity.  She has not noticed any associated blood.  Appetite has been decreased; she has been able to eat a little soup, 1 banana, and has done her best to keep hydrated with Gatorade and water.    Over the past few days, she reports that her stomach has been \"killing me\", primarily the right lower quadrant.  It is constant, the becomes worse if she bends or twists, and is worse after oral intake.  She has had occasional fever, measured as high as 102.0.  She has not measured her temperature recently, though has had intermittent chills and " "sweats.        Review of Systems  Musculoskeletal: Denies arthralgia or red or swollen joints.  Integument: Negative for skin rashes.  Neurologic reports a global headache which waxes and wanes in severity.  Negative for associated visual change, or any numbness or weakness over the face or extremities.  She has not had a stiff neck.      Objective    /68 (BP Location: Left arm, Patient Position: Sitting, Cuff Size: Adult Regular)   Pulse 77   Temp 99  F (37.2  C) (Temporal)   Resp 14   Ht 1.676 m (5' 6\")   Wt 65.5 kg (144 lb 6.4 oz)   LMP 08/16/2003   SpO2 99%   BMI 23.31 kg/m    Body mass index is 23.31 kg/m .  Physical Exam   GENERAL: Pleasant woman, who looks as though she feels poorly.  RESP: No accessory muscle use.  Lungs clear throughout on inspiration and expiration.  Expiration not prolonged, no wheeze.  CV: Regular rate and rhythm, non-tachycardic.  Normal S1 S2, no murmur or extra sound.  No lower extremity edema.  ABDOMEN: Flat, soft.  She reports tenderness to palpation of all abdominal quadrants, very severe by palpation over the RLQ.  She guards slightly over the RLQ but not elsewhere.  Liver and spleen not enlarged, no masses palpable.  Bowel sounds positive.  MS: No bony deformities noted.  No red or inflamed joints.  SKIN: Warm and dry, no rashes.  NEURO: Alert, oriented, conversant.  Cranial nerves III - XII grossly intact.  No gross motor or sensory deficits.    PSYCH: Calm, alert, conversant.  Able to articulate logical thoughts, no tangential thoughts, no hallucinations or delusions.  Affect normal.    CT ABDOMEN AND PELVIS WITH CONTRAST 6/4/2024 10:56 AM     CLINICAL HISTORY: Acute abdominal pain, right lower quadrant  predominates. Exam is nonacute.      TECHNIQUE: CT scan of the abdomen and pelvis was performed following  injection of IV contrast. Multiplanar reformats were obtained. Dose  reduction techniques were used.     CONTRAST: 71mL Isovue-370     COMPARISON: " 8/27/2012     FINDINGS:   LOWER CHEST: Normal.     HEPATOBILIARY: Normal.     PANCREAS: Normal.     SPLEEN: Normal.     ADRENAL GLANDS: Normal.     KIDNEYS/BLADDER: There is mild urinary bladder wall thickening and  adjacent fat stranding and edema, which is most likely reactive in  nature to additional findings described below. No enhancing urinary  bladder mass. Kidneys enhance homogenously and symmetrically. No  cystic or solid renal mass. No nephrolithiasis or hydronephrosis.     BOWEL: The appendix is dilated at 14 mm in diameter (5-151). There is  mucosal hyperenhancement present. The wall of the appendix near the  tip is irregular in appearance. Specifically, the wall is difficult to  delineate and appears discontinuous in portions (5-146), suspicious  for perforation. No extraluminal gas is seen in this region or  elsewhere. There is a small adjacent peripherally enhancing fluid  collection seen in the central pelvis measuring 2.7 x 2.1 cm (5-156).  There is a larger peripherally enhancing fluid collection in the  posterior aspect of the pelvis measuring 2.8 x 6.3 cm (5-155).  Moderate fat stranding and edema is seen within the pelvis. Several  loops of distal small bowel in the pelvis are mildly prominent, but  not technically dilated, and demonstrate mild bowel wall thickening,  which likely represents reactive ileitis. No signs of bowel  obstruction or pneumatosis.     PELVIC ORGANS: Normal.     ADDITIONAL FINDINGS: Moderate atherosclerosis of the abdominal aorta  and iliac arteries. No aneurysmal dilation. A few prominent right  lower quadrant mesenteric lymph nodes measuring up to 9 mm in short  axis are noted and most likely reactive in nature. No lymphadenopathy  by CT size criteria.     MUSCULOSKELETAL: Mild degenerative changes of the spine. Generalized  osteopenia. No acute osseous abnormality.                                                                      IMPRESSION:   1.  Findings  consistent with acute appendicitis with probable  perforation.  2.  Peripherally enhancing intrapelvic fluid collections, suspicious  for developing abscesses.  3.  Probable reactive ileitis of the distal small bowel.     A message was left for the ordering provider, Dr. Horner, at 11:17 AM  on 6/4/2024.  Findings discussed directly with Dr. Horner at 11:35 AM on 6/4/2024.     [Access Center: Probable ruptured appendicitis]     This report will be copied to the [facility] to ensure a  provider acknowledges the finding. [facility] is available Monday  through Friday 8am-3:30 pm.      VANDA ERICKSON MD     Results for orders placed or performed in visit on 06/04/24 (from the past 24 hour(s))   Comprehensive metabolic panel   Result Value Ref Range    Sodium 138 135 - 145 mmol/L    Potassium 3.2 (L) 3.4 - 5.3 mmol/L    Carbon Dioxide (CO2) 27 22 - 29 mmol/L    Anion Gap 16 (H) 7 - 15 mmol/L    Urea Nitrogen 11.6 8.0 - 23.0 mg/dL    Creatinine 0.72 0.51 - 0.95 mg/dL    GFR Estimate >90 >60 mL/min/1.73m2    Calcium 9.6 8.8 - 10.2 mg/dL    Chloride 95 (L) 98 - 107 mmol/L    Glucose 94 70 - 99 mg/dL    Alkaline Phosphatase 51 40 - 150 U/L    AST 32 0 - 45 U/L    ALT 37 0 - 50 U/L    Protein Total 7.1 6.4 - 8.3 g/dL    Albumin 3.9 3.5 - 5.2 g/dL    Bilirubin Total 0.6 <=1.2 mg/dL   CBC with platelets   Result Value Ref Range    WBC Count 12.4 (H) 4.0 - 11.0 10e3/uL    RBC Count 4.04 3.80 - 5.20 10e6/uL    Hemoglobin 12.6 11.7 - 15.7 g/dL    Hematocrit 37.6 35.0 - 47.0 %    MCV 93 78 - 100 fL    MCH 31.2 26.5 - 33.0 pg    MCHC 33.5 31.5 - 36.5 g/dL    RDW 12.6 10.0 - 15.0 %    Platelet Count 236 150 - 450 10e3/uL   Lipase   Result Value Ref Range    Lipase 25 13 - 60 U/L         In 0957  Out 1027  In 1135  Out 1153  In 1235  Out 1306    Signed Electronically by: Bahman Horner MD

## 2024-06-04 NOTE — PROGRESS NOTES
Patient admitted to room 12 at approximately 1715 via cart from emergency room.  Reason for Admission: Appendicitis  Report received from:   Patient was accompanied by Self.  Discharge transportation provided by:  Patient ambulated/transferred:  Assist x 2. air kallie.  Patient is alert and orientated x 3.  Outpatient Observation education provided to: (patient, family, friend)  MDRO Education done if applicable (MRSA, VRE, etc)  Safety risks were identified during admission:  fall.   Yellow risk/fall band applied:  Yes  Detailed Belongings: Cell phone, clothing, shoes, ring, purse

## 2024-06-04 NOTE — ANESTHESIA CARE TRANSFER NOTE
Patient: Arti Bui    Procedure: Procedure(s):  APPENDECTOMY, LAPAROSCOPIC       Diagnosis: Acute appendicitis with perforation and localized peritonitis, without abscess or gangrene [K35.32]  Diagnosis Additional Information: No value filed.    Anesthesia Type:   General     Note:    Oropharynx: oropharynx clear of all foreign objects  Level of Consciousness: awake  Oxygen Supplementation: face mask  Level of Supplemental Oxygen (L/min / FiO2): 6  Independent Airway: airway patency satisfactory and stable  Dentition: dentition unchanged  Vital Signs Stable: post-procedure vital signs reviewed and stable  Report to RN Given: handoff report given  Patient transferred to: PACU    Handoff Report: Identifed the Patient, Identified the Reponsible Provider, Reviewed the pertinent medical history, Discussed the surgical course, Reviewed Intra-OP anesthesia mangement and issues during anesthesia, Set expectations for post-procedure period and Allowed opportunity for questions and acknowledgement of understanding      Vitals:  Vitals Value Taken Time   /65 06/04/24 1612   Temp 37.1  C (98.8  F) 06/04/24 1612   Pulse 73 06/04/24 1613   Resp 14    SpO2 100 % 06/04/24 1613   Vitals shown include unfiled device data.    Electronically Signed By: LIGIA Wright CRNA  June 4, 2024  4:15 PM

## 2024-06-04 NOTE — ANESTHESIA PROCEDURE NOTES
Airway         Procedure Start/Stop Times: 6/4/2024 3:15 PM  Staff -        Performed By: CRNA  Consent for Airway        Urgency: elective  Indications and Patient Condition       Indications for airway management: delmar-procedural       Induction type:intravenous       Mask difficulty assessment: 0 - not attempted    Final Airway Details       Final airway type: endotracheal airway       Successful airway: ETT - single and Oral  Endotracheal Airway Details        Cuffed: yes       Cuff volume (mL): 10       Successful intubation technique: video laryngoscopy       VL Blade Size: Glidescope 3       Grade View of Cords: 1       Adjucts: stylet       Position: Right       Measured from: lips       Secured at (cm): 24       Bite block used: None    Post intubation assessment        Placement verified by: capnometry, equal breath sounds and chest rise        Number of attempts at approach: 1       Secured with: tape       Ease of procedure: easy       Dentition: Intact and Unchanged    Medication(s) Administered   Medication Administration Time: 6/4/2024 3:15 PM

## 2024-06-04 NOTE — TELEPHONE ENCOUNTER
[facility] to discuss situation. They stated that they could take patient and asked RN to call patient to let her know she will be receiving a call from someone at [facility] names Christel.    RN attempted to call patient 3x but was sent to . RN left  that patient should be getting a call soon to be scheduled at [facility], if symptoms worsen go to ER and call back clinic with any further questions.       Lynette Land RN on 6/4/2024 at 8:52 AM

## 2024-06-04 NOTE — H&P
History and physical- Surgery  Arti Mayfield,  1960, MRN [id number]    Admitting Dx: Acute appendicitis with perforation and localized peritonitis, without abscess or gangrene [K35.32]    PCP: Suzanne Stark, [phone]   Code status:  No Order       Extended Emergency Contact Information  Primary Emergency Contact: BASIL MAYFIELD  Address: [address] [place] [id number] [facility]  Home Phone: [phone]  Relation: Spouse  Secondary Emergency Contact: [place] [address] [facility]  Home Phone: [phone]  Mobile Phone: [phone]  Relation: Son       Assessment and Plan   Impression:  Ruptured appendicitis.  The patient's had symptoms for a little over a week.  On the CT scan there is some fluid with possibly developing abscess but not a clear-cut abscess that can be drained.  For that reason I think we should proceed with appendectomy.  Explained that we will try to do this laparoscopic but when symptoms have been going on this long sometimes the procedure needs to go open.  She understands that.  Likely a drain will be left.      Plan:  Laparoscopic appendectomy.  Risk and benefits of surgery explained.  She wishes to proceed.           Chief Complaint <principal problem not specified>       HPI    We have been requested by Dr. Bahman Horner to evaluate Arti Mayfield for appendicitis.  This is a 64 year old year old female with about a 10-day history of just not feeling well and having some pain in the lower abdomen.  2 days ago the pain became much worse and was associated with diarrhea and thought she just had a viral infection.  When there was no improvement and she was starting to feel dehydrated she presented to the [facility] emergency room.  CT scan shows evidence of appendicitis with likely rupture.  There is no clear-cut abscess at this time.  Previous abdominal surgeries are a laparoscopic hysterectomy and a previous tubal  ligation.       Medical History  Patient Active Problem List   Diagnosis    Disease of jaw    HORMONAL REPLACEMT POSTMENOPAUSAL    Tobacco use disorder    CARDIOVASCULAR SCREENING; LDL GOAL LESS THAN 160    Chronic pain    Osteoarthritis    Arthralgia    Advanced directives, counseling/discussion    Hyperplastic colon polyp    Primary osteoarthritis involving multiple joints    Meningioma (H)    Memory loss    Benign hypertension       [unfilled] Surgical History  Past Surgical History:   Procedure Laterality Date    HYSTERECTOMY, PAP NO LONGER INDICATED      SURGICAL HISTORY OF -   03/98    lap tubal ligation     SURGICAL HISTORY OF -   06/00    sinus surgery    SURGICAL HISTORY OF -       TM joint surgery     SURGICAL HISTORY OF -   2003    total vaginal hysterectomy unilateral oophorectomy    SURGICAL HISTORY OF -       salpingectomy - ectopic pregnancy     SURGICAL HISTORY OF -       tonsillectomy        Social History  Social History     Tobacco Use    Smoking status: Former     Current packs/day: 0.00     Average packs/day: 0.5 packs/day for 20.0 years (10.0 ttl pk-yrs)     Types: Cigarettes     Start date: 7/11/1993     Quit date: 7/11/2013     Years since quitting: 10.9    Smokeless tobacco: Former     Quit date: 6/11/2013   Vaping Use    Vaping status: Never Used   Substance Use Topics    Alcohol use: Yes     Comment: occ    Drug use: No       Allergies  Allergies   Allergen Reactions    Bee      Throat closes    Morphine Nausea and Vomiting    Family History  Reviewed, and family history includes Blood Disease in her mother; Cardiovascular in her father; Diabetes in her brother, brother, brother, and father; Parkinsonism in her father and mother.  The Family history is not pertinent to the patients chief complaint. Psychosocial Needs  Social History     Social History Narrative    Not on file     Additional psychosocial needs reviewed per nursing assessment.     Prior to Admission Medications   Current  Outpatient Medications   Medication Instructions    diphenhydrAMINE (BENADRYL) 25 mg, Oral, EVERY 6 HOURS PRN    EPINEPHrine (ANY BX GENERIC EQUIV) 0.3 mg, Intramuscular, ONCE PRN    MULTIVITAMINS PO TABS Reported on 4/7/2017    PARoxetine (PAXIL) 20 mg, Oral, AT BEDTIME    TURMERIC PO No dose, route, or frequency recorded.           Review of Systems:  Pertinent items are noted in HPI. Physical Exam:  Temp:  [98.2  F (36.8  C)-99  F (37.2  C)] 98.2  F (36.8  C)  Pulse:  [73-77] 73  Resp:  [14-16] 16  BP: (103-139)/(65-68) 139/65  SpO2:  [99 %] 99 %    General appearance: alert, appears stated age, and cooperative  Eyes: no abnormalities detected  Lungs: No shortness of breath  Heart: Regular  Abdomen: Sitting up in a chair at this time so unable to truly examine.    Skin: Skin color, texture, turgor normal. No rashes or lesions  Neurologic: Grossly normal       Pertinent Labs  Lab Results: personally reviewed.   Lab Results   Component Value Date    WBC 12.4 06/04/2024    WBC 5.4 12/17/2013    HGB 12.6 06/04/2024    HGB 13.5 12/17/2013    HCT 37.6 06/04/2024    HCT 40.3 12/17/2013    MCV 93 06/04/2024    MCV 96 12/17/2013     06/04/2024     12/17/2013        Pertinent Radiology  Radiology Results: Personally reviewed image/s and Personally reviewed impression/s  EKG Results: not reviewed.

## 2024-06-04 NOTE — TELEPHONE ENCOUNTER
Nurse Triage SBAR  RN received as high priority call  Is this a 2nd Level Triage? YES, LICENSED PRACTITIONER REVIEW IS REQUIRED    Situation: Patient calling in stated she has had constant LRQ abdominal pain and bloating/hardness since Sunday/Monday ranging froom 5-10/10. Patient is concerned about dehydration    Background: Patient has not had history with this, has not had appendicitis, unsure what is causing    Assessment: Patient calling in stating that she has been having 5-10/10 constant LRQ abdominal pain since Sunday/monday. This came on suddenly. Position change such as bending, sitting wrong increases pain to severe 10/10. Pain may radiate across her abdomen R to L. Patient states that her stomach is very bloated and hard and feels the R side may swell more than the other at times. Patient has had constant diarrhea at least 5 episodes daily. She was vomiting Monday but this has seemed to resolve. Patient states she has the chills on and off and has been running a fever around 102 that she is treating with tylenol. Patient states she has been trying to take in a lot of fluids but in the evening her urine is very dark which is not normal for her. Patient denies any blood in urine or stool, SOB or chest pain.  Protocol Recommended Disposition:   Call ADS/Go to ED/UCC Now (Or To Office with PCP Approval)    Recommendation: RN states she would call [place] [facility] if they cannot take patient patient is agreeable to go to [place] ED.      RN spoke with [place] [facility] team they will take take patient and schedule her to be seen.     Does the patient meet one of the following criteria for ADS visit consideration? 16+ years old, with an [facility] PCP     TIP  Providers, please consider if this condition is appropriate for management at one of our Acute and Diagnostic Services sites.     If patient is a good candidate, please use dotphrase <dot>triageresponse and select Refer to ADS to document.           Reason for  "Disposition   MILD TO MODERATE constant pain lasting > 2 hours    Additional Information   Negative: Followed an abdomen (stomach) injury   Negative: Chest pain   Negative: Abdominal pain and pregnant < 20 weeks   Negative: Abdominal pain and pregnant 20 or more weeks   Negative: Pain is mainly in upper abdomen (if needed ask: 'is it mainly above the belly button?')   Negative: Abdomen bloating or swelling are main symptoms   Negative: Black or tarry bowel movements  (Exception: Chronic-unchanged black-grey BMs AND is taking iron pills or Pepto-Bismol.)   Negative: Blood in bowel movements  (Exception: Blood on surface of BM with constipation.)   Negative: Vomiting red blood or black (coffee ground) material   Negative: SEVERE abdominal pain (e.g., excruciating)    Answer Assessment - Initial Assessment Questions  1. LOCATION: \"Where does it hurt?\"       Lower R quadrant   2. RADIATION: \"Does the pain shoot anywhere else?\" (e.g., chest, back)      Across whole stomach from R to L   3. ONSET: \"When did the pain begin?\" (e.g., minutes, hours or days ago)       Approx since Sunday/Monday   4. SUDDEN: \"Gradual or sudden onset?\"      Sudden   5. PATTERN \"Does the pain come and go, or is it constant?\"     - If it comes and goes: \"How long does it last?\" \"Do you have pain now?\"      (Note: Comes and goes means the pain is intermittent. It goes away completely between bouts.)     - If constant: \"Is it getting better, staying the same, or getting worse?\"       (Note: Constant means the pain never goes away completely; most serious pain is constant and gets worse.)       Constant   6. SEVERITY: \"How bad is the pain?\"  (e.g., Scale 1-10; mild, moderate, or severe)     - MILD (1-3): Doesn't interfere with normal activities, abdomen soft and not tender to touch.      - MODERATE (4-7): Interferes with normal activities or awakens from sleep, abdomen tender to touch.      - SEVERE (8-10): Excruciating pain, doubled over, unable to " "do any normal activities.        Bend over 10/10 currently rates pain 5/10   7. RECURRENT SYMPTOM: \"Have you ever had this type of stomach pain before?\" If Yes, ask: \"When was the last time?\" and \"What happened that time?\"       Patient denies   8. CAUSE: \"What do you think is causing the stomach pain?\"      Unsure   9. RELIEVING/AGGRAVATING FACTORS: \"What makes it better or worse?\" (e.g., antacids, bending or twisting motion, bowel movement)      Position changes bending over, sitting wrong,   10. OTHER SYMPTOMS: \"Do you have any other symptoms?\" (e.g., back pain, diarrhea, fever, urination pain, vomiting)        Diarrhea daily with almost every bathroom trip at least 5x/day minimum, vomiting Monday but has resolved, stomach bloated and feels hard all over, tender to the touch, right side sometimes seems to pertrude out more, chills/fever of 102 off and on and has been taking tylenol   11. PREGNANCY: \"Is there any chance you are pregnant?\" \"When was your last menstrual period?\"        no    Protocols used: Abdominal Pain - Female-A-[place]    "

## 2024-06-05 LAB
BASOPHILS # BLD AUTO: 0 10E3/UL (ref 0–0.2)
BASOPHILS NFR BLD AUTO: 0 %
EOSINOPHIL # BLD AUTO: 0 10E3/UL (ref 0–0.7)
EOSINOPHIL NFR BLD AUTO: 0 %
ERYTHROCYTE [DISTWIDTH] IN BLOOD BY AUTOMATED COUNT: 13.1 % (ref 10–15)
GLUCOSE BLDC GLUCOMTR-MCNC: 137 MG/DL (ref 70–99)
GLUCOSE BLDC GLUCOMTR-MCNC: 149 MG/DL (ref 70–99)
HCT VFR BLD AUTO: 33.4 % (ref 35–47)
HGB BLD-MCNC: 11.1 G/DL (ref 11.7–15.7)
IMM GRANULOCYTES # BLD: 0.1 10E3/UL
IMM GRANULOCYTES NFR BLD: 1 %
LYMPHOCYTES # BLD AUTO: 1.2 10E3/UL (ref 0.8–5.3)
LYMPHOCYTES NFR BLD AUTO: 12 %
MAGNESIUM SERPL-MCNC: 2.2 MG/DL (ref 1.7–2.3)
MCH RBC QN AUTO: 31.1 PG (ref 26.5–33)
MCHC RBC AUTO-ENTMCNC: 33.2 G/DL (ref 31.5–36.5)
MCV RBC AUTO: 94 FL (ref 78–100)
MONOCYTES # BLD AUTO: 0.7 10E3/UL (ref 0–1.3)
MONOCYTES NFR BLD AUTO: 7 %
NEUTROPHILS # BLD AUTO: 7.9 10E3/UL (ref 1.6–8.3)
NEUTROPHILS NFR BLD AUTO: 80 %
NRBC # BLD AUTO: 0 10E3/UL
NRBC BLD AUTO-RTO: 0 /100
PLATELET # BLD AUTO: 227 10E3/UL (ref 150–450)
POTASSIUM SERPL-SCNC: 4.5 MMOL/L (ref 3.4–5.3)
POTASSIUM SERPL-SCNC: 4.6 MMOL/L (ref 3.4–5.3)
RBC # BLD AUTO: 3.57 10E6/UL (ref 3.8–5.2)
WBC # BLD AUTO: 9.9 10E3/UL (ref 4–11)

## 2024-06-05 PROCEDURE — 250N000011 HC RX IP 250 OP 636: Performed by: SPECIALIST

## 2024-06-05 PROCEDURE — 258N000003 HC RX IP 258 OP 636: Performed by: SPECIALIST

## 2024-06-05 PROCEDURE — 85041 AUTOMATED RBC COUNT: CPT | Performed by: SPECIALIST

## 2024-06-05 PROCEDURE — 82962 GLUCOSE BLOOD TEST: CPT

## 2024-06-05 PROCEDURE — 99024 POSTOP FOLLOW-UP VISIT: CPT | Performed by: SPECIALIST

## 2024-06-05 PROCEDURE — 250N000013 HC RX MED GY IP 250 OP 250 PS 637: Performed by: SPECIALIST

## 2024-06-05 PROCEDURE — 83735 ASSAY OF MAGNESIUM: CPT | Performed by: EMERGENCY MEDICINE

## 2024-06-05 PROCEDURE — 87081 CULTURE SCREEN ONLY: CPT | Performed by: SPECIALIST

## 2024-06-05 PROCEDURE — 84132 ASSAY OF SERUM POTASSIUM: CPT | Mod: 91 | Performed by: SPECIALIST

## 2024-06-05 PROCEDURE — 36415 COLL VENOUS BLD VENIPUNCTURE: CPT | Performed by: SPECIALIST

## 2024-06-05 RX ORDER — HYDROCODONE BITARTRATE AND ACETAMINOPHEN 5; 325 MG/1; MG/1
1 TABLET ORAL EVERY 6 HOURS PRN
Qty: 10 TABLET | Refills: 0 | Status: SHIPPED | OUTPATIENT
Start: 2024-06-05 | End: 2024-09-17

## 2024-06-05 RX ORDER — AMOXICILLIN 250 MG
1 CAPSULE ORAL 2 TIMES DAILY
Qty: 60 TABLET | Refills: 0 | Status: SHIPPED | OUTPATIENT
Start: 2024-06-05 | End: 2024-07-05

## 2024-06-05 RX ORDER — ACETAMINOPHEN 325 MG/1
650 TABLET ORAL EVERY 4 HOURS PRN
COMMUNITY
Start: 2024-06-07

## 2024-06-05 RX ORDER — IBUPROFEN 600 MG/1
600 TABLET, FILM COATED ORAL EVERY 6 HOURS PRN
COMMUNITY
Start: 2024-06-05

## 2024-06-05 RX ADMIN — SENNOSIDES AND DOCUSATE SODIUM 1 TABLET: 8.6; 5 TABLET ORAL at 22:32

## 2024-06-05 RX ADMIN — PAROXETINE HYDROCHLORIDE 20 MG: 20 TABLET, FILM COATED ORAL at 07:53

## 2024-06-05 RX ADMIN — HYDROCODONE BITARTRATE AND ACETAMINOPHEN 1 TABLET: 5; 325 TABLET ORAL at 09:33

## 2024-06-05 RX ADMIN — PIPERACILLIN AND TAZOBACTAM 3.38 G: 3; .375 INJECTION, POWDER, FOR SOLUTION INTRAVENOUS at 07:53

## 2024-06-05 RX ADMIN — ACETAMINOPHEN 975 MG: 325 TABLET ORAL at 14:22

## 2024-06-05 RX ADMIN — POTASSIUM CHLORIDE, DEXTROSE MONOHYDRATE AND SODIUM CHLORIDE: 150; 5; 450 INJECTION, SOLUTION INTRAVENOUS at 12:26

## 2024-06-05 RX ADMIN — HYDROMORPHONE HYDROCHLORIDE 0.2 MG: 0.2 INJECTION, SOLUTION INTRAMUSCULAR; INTRAVENOUS; SUBCUTANEOUS at 11:19

## 2024-06-05 RX ADMIN — HYDROCODONE BITARTRATE AND ACETAMINOPHEN 1 TABLET: 5; 325 TABLET ORAL at 18:04

## 2024-06-05 RX ADMIN — ACETAMINOPHEN 975 MG: 325 TABLET ORAL at 05:37

## 2024-06-05 RX ADMIN — PIPERACILLIN AND TAZOBACTAM 3.38 G: 3; .375 INJECTION, POWDER, FOR SOLUTION INTRAVENOUS at 17:30

## 2024-06-05 RX ADMIN — ACETAMINOPHEN 975 MG: 325 TABLET ORAL at 22:32

## 2024-06-05 ASSESSMENT — ACTIVITIES OF DAILY LIVING (ADL)
ADLS_ACUITY_SCORE: 21

## 2024-06-05 NOTE — PLAN OF CARE
PODO. VSS on RA, capno. Lap sites bandaid cdi. HEATH drainage marked, no increase wdl. Serosang HEATH drain. Tolerating reg diet. K replaced x1, another K replacement ordered. Ambulating, voiding urine. Incentive Spir encouraged. Pain tx norco, tylenol, ibuprofen. IV inf.

## 2024-06-05 NOTE — UTILIZATION REVIEW
Concurrent stay review; Secondary Review Determination     Under the authority of the Utilization Management Committee, the utilization review process indicated a secondary review on Arti Bui.  The review outcome is based on review of the medical records, discussions with staff, and applying clinical experience noted on the date of the review.        (x) OPP Status Appropriate - Concurrent stay review    RATIONALE FOR DETERMINATION   64-year-old female admitted with acute appendicitis with perforation.  Went to the OR yesterday and underwent laparoscopic appendectomy.  Does have HEATH drain in which she will discharge with.  No fever, white count normal, tolerating oral pain medications and ambulating.  Possible discharge later today.  Consider change to inpatient if acute medical issues arise which require continued hospitalization.    Patient is clinically improving and there is no clear indication to change patient's status to inpatient. The severity of illness, intensity of service provided, expected LOS and risk for adverse outcome make the care appropriate for observation.    The information on this document is developed by the utilization review team in order for the business office to ensure compliance.  This only denotes the appropriateness of proper admission status and does not reflect the quality of care rendered.         The definitions of Inpatient Status and Observation Status used in making the determination above are those provided in the CMS Coverage Manual, Chapter 1 and Chapter 6, section 70.4.      Sincerely,   Kasi Moseley MD  Utilization Review  Physician Advisor  [facility]   
Patent

## 2024-06-05 NOTE — PLAN OF CARE
"  Problem: Adult Inpatient Plan of Care  Goal: Patient-Specific Goal (Individualized)  Description: You can add care plan individualizations to a care plan. Examples of Individualization might be:  \"Parent requests to be called daily at 9am for status\", \"I have a hard time hearing out of my right ear\", or \"Do not touch me to wake me up as it startles  me\".  Outcome: Progressing     Problem: Adult Inpatient Plan of Care  Goal: Plan of Care Review  Description: The Plan of Care Review/Shift note should be completed every shift.  The Outcome Evaluation is a brief statement about your assessment that the patient is improving, declining, or no change.  This information will be displayed automatically on your shift  note.  Flowsheets (Taken 6/5/2024 0320)  Plan of Care Reviewed With: patient  Overall Patient Progress: improving   Goal Outcome Evaluation:      Plan of Care Reviewed With: patient    Overall Patient Progress: improvingOverall Patient Progress: improving           "

## 2024-06-05 NOTE — PROGRESS NOTES
Tired.  Pain well-controlled.  Afebrile, vital signs stable.    Physical exam:  Looks a little peaked this morning.  Abdomen is soft, minimal tenderness.  HEATH drain is serosanguineous.    Laboratories:  White blood count 9.9  Hemoglobin 11.1    Impression: Postop day #1 lap appendectomy for ruptured appendix with abscess.  Doing well.  Will check on her later today.  She may be able to go home later today with her drain in place.  Because of the abscess she may benefit from 1 more day of IV antibiotics.  Saline lock IV.  May advance diet.

## 2024-06-05 NOTE — PLAN OF CARE
PRIMARY DIAGNOSIS: ACUTE PAIN  OUTPATIENT/OBSERVATION GOALS TO BE MET BEFORE DISCHARGE:  1. Pain Status: Improved-controlled with oral pain medications & non-pharmacological measures.     2. Return to near baseline physical activity: Yes    3. Cleared for discharge by consultants (if involved): No    Discharge Planner Nurse   Safe discharge environment identified: No  Barriers to discharge: Yes.        Entered by: Aldair Zaidi RN 06/05/2024 8:46 AM     Please review provider order for any additional goals.   Nurse to notify provider when observation goals have been met and patient is ready for discharge.

## 2024-06-05 NOTE — PROGRESS NOTES
PRIMARY DIAGNOSIS: ACUTE PAIN  OUTPATIENT/OBSERVATION GOALS TO BE MET BEFORE DISCHARGE:  1. Pain Status: Improved-controlled with oral pain medications.    2. Return to near baseline physical activity: No    3. Cleared for discharge by consultants (if involved): N/A    Discharge Planner Nurse   Safe discharge environment identified: Yes  Barriers to discharge: Yes       Entered by: Shireen Wang RN 06/05/2024 6:02 AM     Please review provider order for any additional goals.   Nurse to notify provider when observation goals have been met and patient is ready for discharge.    Pt A&Ox4. VSS. On Room air. HEATH bulb drain emptied, 30ml of red drainage. Gauze marked, increased drainage. Had pain, tylenol given.   Mg, Potassium protocol. 2.4 and 4.6 respectively.

## 2024-06-05 NOTE — PLAN OF CARE
Problem: Adult Inpatient Plan of Care  Goal: Optimal Comfort and Wellbeing  Outcome: Progressing     Problem: Adult Inpatient Plan of Care  Goal: Absence of Hospital-Acquired Illness or Injury  Intervention: Prevent Infection  Recent Flowsheet Documentation  Taken 6/5/2024 0807 by Aldair Zaidi, RN  Infection Prevention: hand hygiene promoted    Goal Outcome Evaluation:      Plan of Care Reviewed With: patient      A&Ox4. RA. C/o pain at incision site. 1x PRN norco given. L PIV D5 50cc. HEATH adequate serosang output. Mg & K protocol - replaced in the AM. 1x loose BM this AM. Up independently to the BR. Postop day 1 . Continue POC.

## 2024-06-05 NOTE — PLAN OF CARE
PRIMARY DIAGNOSIS: ACUTE PAIN  OUTPATIENT/OBSERVATION GOALS TO BE MET BEFORE DISCHARGE:  1. Pain Status: Improved-controlled with oral pain medications.    2. Return to near baseline physical activity: Yes    3. Cleared for discharge by consultants (if involved): No    Discharge Planner Nurse   Safe discharge environment identified: Yes  Barriers to discharge: Yes       Entered by: Aldair Zaidi RN 06/05/2024 12:38 PM     Please review provider order for any additional goals.   Nurse to notify provider when observation goals have been met and patient is ready for discharge.

## 2024-06-06 VITALS
RESPIRATION RATE: 16 BRPM | HEART RATE: 66 BPM | DIASTOLIC BLOOD PRESSURE: 65 MMHG | TEMPERATURE: 98.4 F | WEIGHT: 146.7 LBS | BODY MASS INDEX: 23.68 KG/M2 | OXYGEN SATURATION: 100 % | SYSTOLIC BLOOD PRESSURE: 133 MMHG

## 2024-06-06 LAB
GLUCOSE BLDC GLUCOMTR-MCNC: 87 MG/DL (ref 70–99)
MAGNESIUM SERPL-MCNC: 1.9 MG/DL (ref 1.7–2.3)
PATH REPORT.COMMENTS IMP SPEC: NORMAL
PATH REPORT.COMMENTS IMP SPEC: NORMAL
PATH REPORT.FINAL DX SPEC: NORMAL
PATH REPORT.GROSS SPEC: NORMAL
PATH REPORT.MICROSCOPIC SPEC OTHER STN: NORMAL
PATH REPORT.RELEVANT HX SPEC: NORMAL
PHOTO IMAGE: NORMAL
POTASSIUM SERPL-SCNC: 4.4 MMOL/L (ref 3.4–5.3)

## 2024-06-06 PROCEDURE — 84132 ASSAY OF SERUM POTASSIUM: CPT | Mod: 91 | Performed by: EMERGENCY MEDICINE

## 2024-06-06 PROCEDURE — 82962 GLUCOSE BLOOD TEST: CPT

## 2024-06-06 PROCEDURE — 83735 ASSAY OF MAGNESIUM: CPT | Performed by: EMERGENCY MEDICINE

## 2024-06-06 PROCEDURE — 36415 COLL VENOUS BLD VENIPUNCTURE: CPT | Performed by: EMERGENCY MEDICINE

## 2024-06-06 PROCEDURE — 250N000013 HC RX MED GY IP 250 OP 250 PS 637: Performed by: SPECIALIST

## 2024-06-06 PROCEDURE — 250N000011 HC RX IP 250 OP 636: Performed by: SPECIALIST

## 2024-06-06 RX ADMIN — HYDROCODONE BITARTRATE AND ACETAMINOPHEN 1 TABLET: 5; 325 TABLET ORAL at 09:45

## 2024-06-06 RX ADMIN — SENNOSIDES AND DOCUSATE SODIUM 1 TABLET: 8.6; 5 TABLET ORAL at 09:11

## 2024-06-06 RX ADMIN — POLYETHYLENE GLYCOL 3350 17 G: 17 POWDER, FOR SOLUTION ORAL at 09:11

## 2024-06-06 RX ADMIN — PAROXETINE HYDROCHLORIDE 20 MG: 20 TABLET, FILM COATED ORAL at 09:11

## 2024-06-06 RX ADMIN — HYDROCODONE BITARTRATE AND ACETAMINOPHEN 1 TABLET: 5; 325 TABLET ORAL at 01:04

## 2024-06-06 RX ADMIN — PIPERACILLIN AND TAZOBACTAM 3.38 G: 3; .375 INJECTION, POWDER, FOR SOLUTION INTRAVENOUS at 00:51

## 2024-06-06 RX ADMIN — ACETAMINOPHEN 975 MG: 325 TABLET ORAL at 05:36

## 2024-06-06 ASSESSMENT — ACTIVITIES OF DAILY LIVING (ADL)
ADLS_ACUITY_SCORE: 20

## 2024-06-06 NOTE — PROGRESS NOTES
Feels much better this morning.  Tolerating diet.  Passing gas.  Afebrile, vital signs stable.    Physical exam:  Sitting up in bed.  Looks well.  HEATH drain is serosanguineous.    Postop day #2 laparoscopic appendectomy for ruptured appendix.  Looks good.  Home today with drain.  Follow-up with me next week.

## 2024-06-06 NOTE — PLAN OF CARE
PRIMARY DIAGNOSIS: ACUTE PAIN  OUTPATIENT/OBSERVATION GOALS TO BE MET BEFORE DISCHARGE:  1. Pain Status: Improved-controlled with oral pain medications.    2. Return to near baseline physical activity: Yes    3. Cleared for discharge by consultants (if involved): Yes    Discharge Planner Nurse   Safe discharge environment identified: Yes  Barriers to discharge: Yes       Entered by: Betty Smith RN 06/06/2024 2:14 AM     Please review provider order for any additional goals.   Nurse to notify provider when observation goals have been met and patient is ready for discharge.Goal Outcome Evaluation:

## 2024-06-06 NOTE — PROGRESS NOTES
Care Management Discharge Note    Discharge Date: 06/06/2024       Discharge Disposition:  home     Discharge Services:      Discharge DME:  HEATH Drain    Discharge Transportation:  family    Private pay costs discussed: Not applicable    Does the patient's insurance plan have a 3 day qualifying hospital stay waiver?  No    PAS Confirmation Code:  na  Patient/family educated on Medicare website which has current facility and service quality ratings:  na    Education Provided on the Discharge Plan:  yes  Persons Notified of Discharge Plans: pt  Patient/Family in Agreement with the Plan:      Handoff Referral Completed: Yes    Additional Information:  Pt discharging home w/HEATH drain and follow up in clinic 6/11 w/DR Nelson. No CM needs.    Diamond Arriola RN

## 2024-06-06 NOTE — PLAN OF CARE
"PRIMARY DIAGNOSIS: \"GENERIC\" NURSING  OUTPATIENT/OBSERVATION GOALS TO BE MET BEFORE DISCHARGE:  ADLs back to baseline: No    Activity and level of assistance: Ambulating independently.    Pain status: Improved but still requiring IV narcotics.    Return to near baseline physical activity: Yes     Discharge Planner Nurse   Safe discharge environment identified: Yes  Barriers to discharge: No       Entered by: Yanely Perez RN 06/06/2024 12:21 AM    Pt A&O X 4. C/O pain Narco was given with relief. Pt had low HR provider notified. Pt walks to the bathroom independently had dinner resting in the bed.  "

## 2024-06-06 NOTE — PLAN OF CARE
"Goal Outcome Evaluation:  PRIMARY DIAGNOSIS: \"GENERIC\" NURSING  OUTPATIENT/OBSERVATION GOALS TO BE MET BEFORE DISCHARGE:  ADLs back to baseline: No    Activity and level of assistance: Ambulating independently.    Pain status: Improved-controlled with oral pain medications.    Return to near baseline physical activity: No     Discharge Planner Nurse   Safe discharge environment identified: Yes  Barriers to discharge: Yes  Entered by: Yanely Perez RN 06/06/2024 12:41 AM    Pt is A&O X 4. C/O pain 975 mg Tylenol was given with relief. Pt walks to the bathroom independently HEATH was emptied 40 ml.  "

## 2024-06-06 NOTE — DISCHARGE SUMMARY
Physician Discharge Summary    Primary Care Physician:  Suzanne Stark    Discharge Provider: Catrachita Nelson MD     Admission Date: 6/4/2024    Discharge Date: June 6, 2024     Admission Diagnoses: Acute appendicitis with perforation and localized peritonitis, without abscess or gangrene [K35.32]     Disposition: No follow-ups on file.  Condition at Discharge: Good       Principal Diagnosis:  <principal problem not specified>    Discharge Diagnoses:  Active Problems:    Ruptured appendicitis      Procedures: SD LAPAROSCOPY, SURGICAL; APPENDECTOMY [98265] (APPENDECTOMY, LAPAROSCOPIC)    Hospital Summary: Patient admitted to the hospital for laparoscopic appendectomy.  Discharged home on postop day #2 in good condition with drain    Discharge Medications:   See the med rec        Discharge Instructions:  Follow up appointment with Primary Care Physician: As needed  Follow up appointment with Specialist: Dr. Nelson  Diet: Regular as tolerated  Activity: As tolerated  Wound / drain care: As directed

## 2024-06-06 NOTE — PLAN OF CARE
Discharge summary reviewed w/ and provided to pt. Education for new medications and HEATH drain completed. Instructed to  medications from designated pharmacy. IV removed and all belongings w/ pt. Spouse transported pt home at 1012.

## 2024-06-06 NOTE — PLAN OF CARE
Problem: Pain Acute  Goal: Optimal Pain Control and Function  Outcome: Progressing  Intervention: Develop Pain Management Plan  Recent Flowsheet Documentation  Taken 6/6/2024 0536 by Betty Smith RN  Pain Management Interventions: medication (see MAR)  Taken 6/6/2024 0406 by Betty Smith RN  Pain Management Interventions: rest  Taken 6/6/2024 0149 by Betty Smith RN  Pain Management Interventions:   rest   repositioned  Taken 6/6/2024 0104 by Betty Smith RN  Pain Management Interventions: medication (see MAR)  Intervention: Prevent or Manage Pain  Recent Flowsheet Documentation  Taken 6/6/2024 0408 by Betty Smith RN  Medication Review/Management: medications reviewed  Taken 6/6/2024 0106 by Betty Smith RN  Medication Review/Management: medications reviewed     Problem: Adult Inpatient Plan of Care  Goal: Optimal Comfort and Wellbeing  Outcome: Progressing  Intervention: Monitor Pain and Promote Comfort  Recent Flowsheet Documentation  Taken 6/6/2024 0536 by Betty Smith RN  Pain Management Interventions: medication (see MAR)  Taken 6/6/2024 0406 by Betty Smith RN  Pain Management Interventions: rest  Taken 6/6/2024 0149 by Betty Smith RN  Pain Management Interventions:   rest   repositioned  Taken 6/6/2024 0104 by Betty Smith RN  Pain Management Interventions: medication (see MAR)     Problem: Adult Inpatient Plan of Care  Goal: Absence of Hospital-Acquired Illness or Injury  Intervention: Prevent Skin Injury  Recent Flowsheet Documentation  Taken 6/6/2024 0408 by Betty Smith RN  Body Position: position changed independently  Taken 6/6/2024 0106 by Betty Smith RN  Body Position: position changed independently   Goal Outcome Evaluation:       Patient admitted for ruptured appendicitis. Had appendectomy . Patient doing okay. Norco given around 0100 for pain, and was effective. AOX4. RA. On regular diet. Still on contact for MRSA. Independent.  Ambulated to the bathroom twice throughout the night. Dextrose 5%0.45Nacl+KCL 20 mEq/L running at 50 ml/hr. On Mg and K protocol. Output in the HEATH is 50 ml, pink in color. Dressing around the HEATH drain changed.

## 2024-06-07 ENCOUNTER — TELEPHONE (OUTPATIENT)
Dept: SURGERY | Facility: CLINIC | Age: 64
End: 2024-06-07
Payer: COMMERCIAL

## 2024-06-07 LAB — BACTERIA SPEC CULT: NORMAL

## 2024-06-07 NOTE — TELEPHONE ENCOUNTER
Called patient for post-op check in. No answer. Left message for patient to call clinic if having any issues.       [facility]     Julissa Whitt  RN, BSN  [facility]  General Surgery  [address] [place] [id number]  [email or web address]   Office:[phone]  Employed by [facility],

## 2024-06-11 ENCOUNTER — OFFICE VISIT (OUTPATIENT)
Dept: SURGERY | Facility: CLINIC | Age: 64
End: 2024-06-11
Payer: COMMERCIAL

## 2024-06-11 DIAGNOSIS — Z98.890 POSTOPERATIVE STATE: Primary | ICD-10-CM

## 2024-06-11 PROCEDURE — 99024 POSTOP FOLLOW-UP VISIT: CPT | Performed by: SPECIALIST

## 2024-06-11 NOTE — LETTER
6/11/2024      Arti Bui  [address]  [place] [place] [facility] [address]      Dear Colleague,    Thank you for referring your patient, Arti Bui, to the [facility] [place]. Please see a copy of my visit note below.    Arti is in for follow-up of a laparoscopic appendectomy done for a ruptured appendix.    She is now just a little less than 1 weeks out from surgery.  Pain is well controlled. No fevers. Eating fairly well.    Physical exam:  General: She is moving around well with no signs of discomfort.  Abdomen: Soft minimal tenderness.  The incision(s) is healing up nicely with no signs of infection.  Very little serous fluid out of the HEATH drain and I pulled that today.        Impression: Doing well postoperatively.  No signs of complications.  Plan: Encouraged  to slowly get back to normal activities.  Told that it would be at least another 2-4 weeks before getting back to normal.  Follow-up with me will be as needed.       Again, thank you for allowing me to participate in the care of your patient.        Sincerely,        Catrachita Nelson MD

## 2024-06-27 DIAGNOSIS — F32.A DEPRESSION, UNSPECIFIED DEPRESSION TYPE: Primary | ICD-10-CM

## 2024-06-27 RX ORDER — PAROXETINE 20 MG/1
20 TABLET, FILM COATED ORAL EVERY MORNING
Qty: 90 TABLET | Refills: 0 | Status: SHIPPED | OUTPATIENT
Start: 2024-06-27 | End: 2024-09-17

## 2024-06-27 NOTE — LETTER
[facility]  [address] [facility] [address]  [phone]  June 27, 2024    Arti Bui  [address] [facility] [address]    Dear Arti,    We care about your health and have reviewed your health plan including your medical conditions, medication list, and lab results.  Based on this review, it is recommended that you follow up regarding the following health topic(s):     Prescription Provided for 3 Months -Wellness (Physical) Visit for Medication Refills appointment needed for ANY further refills    Please call us at [phone] (or use [employer]) to address the above recommendations.     Thank you for trusting [facility] and we appreciate the opportunity to serve you.  We look forward to supporting your healthcare needs in the future.    Healthy Regards,      Your Health Care Team  [facility]

## 2024-06-27 NOTE — TELEPHONE ENCOUNTER
Medication Question or Refill    Contacts       Contact Date/Time Type Contact Phone/Fax    06/27/2024 08:41 AM CDT Phone (Incoming) Hao Arti CELSA (Self) [phone] (M)            What medication are you calling about (include dose and sig)?:   PARoxetine (PAXIL) 20 MG tablet      [place]erred Pharmacy:       [facility] [address]  [address] [facility] [address]  Phone: [phone] Fax: [phone] Alternate Fax: [phone]        Controlled Substance Agreement on file:   CSA -- Patient Level:    CSA: None found at the patient level.       Who prescribed the medication?: Suzanne estrella    Do you need a refill? Yes    When did you use the medication last? na    Patient offered an appointment? No    Do you have any questions or concerns?  Yes: Pt wants refills on PARoxetine (PAXIL) 20 MG tablet. Pt wants to know if pcp can get this refill without seeing pcp. Pt does not want any more bills as she was in the hospital and have a medical bill of $10,000. If pcp can just fill medication for pt.       Could we send this information to you in [employer] or would you prefer to receive a phone call?:   Patient would prefer a phone call   Okay to leave a detailed message?: Yes at Cell number on file:    Telephone Information:   Mobile [phone]

## 2024-06-27 NOTE — TELEPHONE ENCOUNTER
Last visit in person with PCP was a year ago.  Refilled x 3 months, but ** VISIT DUE/LAST FILL **     Eloise Moseley M.D.

## 2024-08-04 ENCOUNTER — HEALTH MAINTENANCE LETTER (OUTPATIENT)
Age: 64
End: 2024-08-04

## 2024-09-10 ENCOUNTER — TELEPHONE (OUTPATIENT)
Dept: FAMILY MEDICINE | Facility: CLINIC | Age: 64
End: 2024-09-10
Payer: COMMERCIAL

## 2024-09-10 NOTE — LETTER
September 10, 2024    To  Arti Bui  [address]  [place] [facility] [address]    Your team at [facility] cares about your health. We have reviewed your chart and based on our findings; we are making the following recommendations to better manage your health.     You are in particular need of attention regarding the following:     Schedule Annual MAMMOGRAPHY. [facility] scheduling number is [phone] or schedule in [employer] (self referral).    If you have already completed these items, please contact the clinic via phone or   [employer] so your care team can review and update your records. Thank you for   choosing [facility] for your healthcare needs. For any questions,   concerns, or to schedule an appointment please contact our clinic.    Healthy Regards,      Your [facility] Care Team

## 2024-09-10 NOTE — TELEPHONE ENCOUNTER
Patient Quality Outreach    Patient is due for the following:   Breast Cancer Screening - Mammogram    Next Steps:   mammo    Type of outreach:    Sent [employer] message.      Questions for provider review:    None           Catarina Eli CMA

## 2024-09-17 ENCOUNTER — OFFICE VISIT (OUTPATIENT)
Dept: FAMILY MEDICINE | Facility: CLINIC | Age: 64
End: 2024-09-17
Payer: COMMERCIAL

## 2024-09-17 VITALS
SYSTOLIC BLOOD PRESSURE: 112 MMHG | OXYGEN SATURATION: 99 % | DIASTOLIC BLOOD PRESSURE: 60 MMHG | TEMPERATURE: 97.7 F | RESPIRATION RATE: 16 BRPM | HEIGHT: 66 IN | WEIGHT: 147 LBS | BODY MASS INDEX: 23.63 KG/M2 | HEART RATE: 69 BPM

## 2024-09-17 DIAGNOSIS — F32.A DEPRESSION, UNSPECIFIED DEPRESSION TYPE: ICD-10-CM

## 2024-09-17 DIAGNOSIS — Z00.00 ROUTINE GENERAL MEDICAL EXAMINATION AT A HEALTH CARE FACILITY: Primary | ICD-10-CM

## 2024-09-17 DIAGNOSIS — Z13.220 SCREENING FOR LIPOID DISORDERS: ICD-10-CM

## 2024-09-17 DIAGNOSIS — Z12.31 VISIT FOR SCREENING MAMMOGRAM: ICD-10-CM

## 2024-09-17 LAB
CHOLEST SERPL-MCNC: 317 MG/DL
FASTING STATUS PATIENT QL REPORTED: YES
HDLC SERPL-MCNC: 59 MG/DL
LDLC SERPL CALC-MCNC: 221 MG/DL
NONHDLC SERPL-MCNC: 258 MG/DL
TRIGL SERPL-MCNC: 185 MG/DL

## 2024-09-17 PROCEDURE — 36415 COLL VENOUS BLD VENIPUNCTURE: CPT | Performed by: NURSE PRACTITIONER

## 2024-09-17 PROCEDURE — 90471 IMMUNIZATION ADMIN: CPT | Performed by: NURSE PRACTITIONER

## 2024-09-17 PROCEDURE — 99396 PREV VISIT EST AGE 40-64: CPT | Mod: 59 | Performed by: NURSE PRACTITIONER

## 2024-09-17 PROCEDURE — 90673 RIV3 VACCINE NO PRESERV IM: CPT | Performed by: NURSE PRACTITIONER

## 2024-09-17 PROCEDURE — 99213 OFFICE O/P EST LOW 20 MIN: CPT | Mod: 25 | Performed by: NURSE PRACTITIONER

## 2024-09-17 PROCEDURE — 80061 LIPID PANEL: CPT | Performed by: NURSE PRACTITIONER

## 2024-09-17 RX ORDER — PAROXETINE 20 MG/1
20 TABLET, FILM COATED ORAL EVERY MORNING
Qty: 90 TABLET | Refills: 3 | Status: SHIPPED | OUTPATIENT
Start: 2024-09-17

## 2024-09-17 ASSESSMENT — PAIN SCALES - GENERAL: PAINLEVEL: NO PAIN (0)

## 2024-09-17 NOTE — PATIENT INSTRUCTIONS
Flu shot given.  Can get shingles vaccine if you wish.  Will be notified of pending labs.  Call [phone] to schedule mammogram.        Patient Education   Preventive Care Advice   This is general advice given by our system to help you stay healthy. However, your care team may have specific advice just for you. Please talk to your care team about your preventive care needs.  Nutrition  Eat 5 or more servings of fruits and vegetables each day.  Try wheat bread, brown rice and whole grain pasta (instead of white bread, rice, and pasta).  Get enough calcium and vitamin D. Check the label on foods and aim for 100% of the RDA (recommended daily allowance).  Lifestyle  Exercise at least 150 minutes each week  (30 minutes a day, 5 days a week).  Do muscle strengthening activities 2 days a week. These help control your weight and prevent disease.  No smoking.  Wear sunscreen to prevent skin cancer.  Have a dental exam and cleaning every 6 months.  Yearly exams  See your health care team every year to talk about:  Any changes in your health.  Any medicines your care team has prescribed.  Preventive care, family planning, and ways to prevent chronic diseases.  Shots (vaccines)   HPV shots (up to age 26), if you've never had them before.  Hepatitis B shots (up to age 59), if you've never had them before.  COVID-19 shot: Get this shot when it's due.  Flu shot: Get a flu shot every year.  Tetanus shot: Get a tetanus shot every 10 years.  Pneumococcal, hepatitis A, and RSV shots: Ask your care team if you need these based on your risk.  Shingles shot (for age 50 and up)  General health tests  Diabetes screening:  Starting at age 35, Get screened for diabetes at least every 3 years.  If you are younger than age 35, ask your care team if you should be screened for diabetes.  Cholesterol test: At age 39, start having a cholesterol test every 5 years, or more often if advised.  Bone density scan (DEXA): At age 50, ask your care  team if you should have this scan for osteoporosis (brittle bones).  Hepatitis C: Get tested at least once in your life.  STIs (sexually transmitted infections)  Before age 24: Ask your care team if you should be screened for STIs.  After age 24: Get screened for STIs if you're at risk. You are at risk for STIs (including HIV) if:  You are sexually active with more than one person.  You don't use condoms every time.  You or a partner was diagnosed with a sexually transmitted infection.  If you are at risk for HIV, ask about PrEP medicine to prevent HIV.  Get tested for HIV at least once in your life, whether you are at risk for HIV or not.  Cancer screening tests  Cervical cancer screening: If you have a cervix, begin getting regular cervical cancer screening tests starting at age 21.  Breast cancer scan (mammogram): If you've ever had breasts, begin having regular mammograms starting at age 40. This is a scan to check for breast cancer.  Colon cancer screening: It is important to start screening for colon cancer at age 45.  Have a colonoscopy test every 10 years (or more often if you're at risk) Or, ask your provider about stool tests like a FIT test every year or Cologuard test every 3 years.  To learn more about your testing options, visit:   .  For help making a decision, visit:   https://bit.ly/nz70780.  Prostate cancer screening test: If you have a prostate, ask your care team if a prostate cancer screening test (PSA) at age 55 is right for you.  Lung cancer screening: If you are a current or former smoker ages 50 to 80, ask your care team if ongoing lung cancer screenings are right for you.  For informational purposes only. Not to replace the advice of your health care provider. Copyright   2023 [employer]. All rights reserved. Clinically reviewed by the [facility] Transitions Program. [employer] 876111 - REV 01/24.

## 2024-09-17 NOTE — PROGRESS NOTES
Preventive Care Visit  [facility]  LIGIA Lee CNP, Family Medicine  Sep 17, 2024      Assessment & Plan     Routine general medical examination at a health care facility      Depression, unspecified depression type    - PARoxetine (PAXIL) 20 MG tablet; Take 1 tablet (20 mg) by mouth every morning. ** VISIT DUE/LAST FILL **    Visit for screening mammogram    - MA Screening Bilateral w/ Davonte; Future    Screening for lipoid disorders    - Lipid panel reflex to direct LDL Fasting; Future  - Lipid panel reflex to direct LDL Fasting            Counseling  Appropriate preventive services were addressed with this patient via screening, questionnaire, or discussion as appropriate for fall prevention, nutrition, physical activity, Tobacco-use cessation, social engagement, weight loss and cognition.  Checklist reviewing preventive services available has been given to the patient.  Reviewed patient's diet, addressing concerns and/or questions.       See Patient Instructions  Patient Instructions   Flu shot given.  Can get shingles vaccine if you wish.  Will be notified of pending labs.  Call [phone] to schedule mammogram.        Patient Education  Preventive Care Advice   This is general advice given by our system to help you stay healthy. However, your care team may have specific advice just for you. Please talk to your care team about your preventive care needs.  Nutrition  Eat 5 or more servings of fruits and vegetables each day.  Try wheat bread, brown rice and whole grain pasta (instead of white bread, rice, and pasta).  Get enough calcium and vitamin D. Check the label on foods and aim for 100% of the RDA (recommended daily allowance).  Lifestyle  Exercise at least 150 minutes each week  (30 minutes a day, 5 days a week).  Do muscle strengthening activities 2 days a week. These help control your weight and prevent disease.  No smoking.  Wear sunscreen to prevent skin  cancer.  Have a dental exam and cleaning every 6 months.  Yearly exams  See your health care team every year to talk about:  Any changes in your health.  Any medicines your care team has prescribed.  Preventive care, family planning, and ways to prevent chronic diseases.  Shots (vaccines)   HPV shots (up to age 26), if you've never had them before.  Hepatitis B shots (up to age 59), if you've never had them before.  COVID-19 shot: Get this shot when it's due.  Flu shot: Get a flu shot every year.  Tetanus shot: Get a tetanus shot every 10 years.  Pneumococcal, hepatitis A, and RSV shots: Ask your care team if you need these based on your risk.  Shingles shot (for age 50 and up)  General health tests  Diabetes screening:  Starting at age 35, Get screened for diabetes at least every 3 years.  If you are younger than age 35, ask your care team if you should be screened for diabetes.  Cholesterol test: At age 39, start having a cholesterol test every 5 years, or more often if advised.  Bone density scan (DEXA): At age 50, ask your care team if you should have this scan for osteoporosis (brittle bones).  Hepatitis C: Get tested at least once in your life.  STIs (sexually transmitted infections)  Before age 24: Ask your care team if you should be screened for STIs.  After age 24: Get screened for STIs if you're at risk. You are at risk for STIs (including HIV) if:  You are sexually active with more than one person.  You don't use condoms every time.  You or a partner was diagnosed with a sexually transmitted infection.  If you are at risk for HIV, ask about PrEP medicine to prevent HIV.  Get tested for HIV at least once in your life, whether you are at risk for HIV or not.  Cancer screening tests  Cervical cancer screening: If you have a cervix, begin getting regular cervical cancer screening tests starting at age 21.  Breast cancer scan (mammogram): If you've ever had breasts, begin having regular mammograms starting at  age 40. This is a scan to check for breast cancer.  Colon cancer screening: It is important to start screening for colon cancer at age 45.  Have a colonoscopy test every 10 years (or more often if you're at risk) Or, ask your provider about stool tests like a FIT test every year or Cologuard test every 3 years.  To learn more about your testing options, visit:   .  For help making a decision, visit:   https://bit.ly/fg98058.  Prostate cancer screening test: If you have a prostate, ask your care team if a prostate cancer screening test (PSA) at age 55 is right for you.  Lung cancer screening: If you are a current or former smoker ages 50 to 80, ask your care team if ongoing lung cancer screenings are right for you.  For informational purposes only. Not to replace the advice of your health care provider. Copyright   2023 [employer]. All rights reserved. Clinically reviewed by the [facility] Transitions Program. [employer] [id number] - REV 01/24.       Chula Chi is a 64 year old, presenting for the following:  Physical        9/17/2024    11:09 AM   Additional Questions   Roomed by LUTHEREli        [facility] Care Directive  Patient does not have a Health Care Directive or Living Will: Discussed advance care planning with patient; information given to patient to review.    HPI    She is generally doing well. Feels she is still recovering from her ruptured appendix she had in June.  No other acute concerns.          9/17/2024   General Health   How would you rate your overall physical health? Good   Feel stress (tense, anxious, or unable to sleep) Not at all            9/17/2024   Nutrition   Three or more servings of calcium each day? Yes   Diet: Regular (no restrictions)   How many servings of fruit and vegetables per day? 4 or more   How many sweetened beverages each day? 0-1            9/17/2024   Exercise   Days per week of moderate/strenous exercise 7 days            9/17/2024   Social Factors    Frequency of gathering with friends or relatives Twice a week   Worry food won't last until get money to buy more No   Food not last or not have enough money for food? No   Do you have housing? (Housing is defined as stable permanent housing and does not include staying ouside in a car, in a tent, in an abandoned building, in an overnight shelter, or couch-surfing.) Yes   Are you worried about losing your housing? No   Lack of transportation? No   Unable to get utilities (heat,electricity)? No            2024   Fall Risk   Fallen 2 or more times in the past year? No   Trouble with walking or balance? No             2024   Dental   Dentist two times every year? Yes            2024   TB Screening   Were you born outside of the [place]? No            Today's PHQ-2 Score:       2024    10:59 AM   PHQ-2 (  [employer])   Q1: Little interest or pleasure in doing things 0   Q2: Feeling down, depressed or hopeless 0   PHQ-2 Score 0   Q1: Little interest or pleasure in doing things Not at all   Q2: Feeling down, depressed or hopeless Not at all   PHQ-2 Score 0           2024   Substance Use   Alcohol more than 3/day or more than 7/wk No   Do you use any other substances recreationally? No        Social History     Tobacco Use    Smoking status: Former     Current packs/day: 0.00     Average packs/day: 0.5 packs/day for 20.0 years (10.0 ttl pk-yrs)     Types: Cigarettes     Start date: 1993     Quit date: 2013     Years since quittin.1    Smokeless tobacco: Former     Quit date: 2013   Vaping Use    Vaping status: Never Used   Substance Use Topics    Alcohol use: Yes     Comment: occ    Drug use: No           2021   LAST FHS-7 RESULTS   1st degree relative breast or ovarian cancer No   Any relative bilateral breast cancer No   Any male have breast cancer No   Any ONE woman have BOTH breast AND ovarian cancer No   Any woman with breast cancer before 50yrs No   2 or more relatives  with breast AND/OR ovarian cancer No   2 or more relatives with breast AND/OR bowel cancer No           Mammogram Screening - Mammogram every 1-2 years updated in Health Maintenance based on mutual decision making        9/17/2024   STI Screening   New sexual partner(s) since last STI/HIV test? No        History of abnormal Pap smear: Status post hysterectomy with removal of cervix and no history of CIN2 or greater or cervical cancer. Health Maintenance and Surgical History updated.        6/6/2007    12:00 AM   PAP / HPV   PAP (Historical) NIL      ASCVD Risk   The 10-year ASCVD risk score (Efrem MA, et al., 2019) is: 4%    Values used to calculate the score:      Age: 64 years      Sex: Female      Is Non- : No      Diabetic: No      Tobacco smoker: No      Systolic Blood Pressure: 112 mmHg      Is BP treated: No      HDL Cholesterol: 58 mg/dL      Total Cholesterol: 294 mg/dL           Reviewed and updated as needed this visit by Provider                    BP Readings from Last 3 Encounters:   09/17/24 112/60   06/06/24 133/65   06/04/24 103/68    Wt Readings from Last 3 Encounters:   09/17/24 66.7 kg (147 lb)   06/04/24 66.5 kg (146 lb 11.2 oz)   06/04/24 65.5 kg (144 lb 6.4 oz)                  Patient Active Problem List   Diagnosis    Disease of jaw    HORMONAL REPLACEMT POSTMENOPAUSAL    Tobacco use disorder    CARDIOVASCULAR SCREENING; LDL GOAL LESS THAN 160    Chronic pain    Osteoarthritis    Arthralgia    Hyperplastic colon polyp    Primary osteoarthritis involving multiple joints    Meningioma (H)    Memory loss    Benign hypertension    Ruptured appendicitis     Past Surgical History:   Procedure Laterality Date    HYSTERECTOMY, PAP NO LONGER INDICATED      LAPAROSCOPIC APPENDECTOMY N/A 6/4/2024    Procedure: APPENDECTOMY, LAPAROSCOPIC;  Surgeon: Catrachita Nelson MD;  Location: [facility] OR    SURGICAL HISTORY OF - 03/98    lap tubal ligation     SURGICAL HISTORY  OF -       sinus surgery    SURGICAL HISTORY OF -       TM joint surgery     SURGICAL HISTORY OF -       total vaginal hysterectomy unilateral oophorectomy    SURGICAL HISTORY OF -       salpingectomy - ectopic pregnancy     SURGICAL HISTORY OF -       tonsillectomy       Social History     Tobacco Use    Smoking status: Former     Current packs/day: 0.00     Average packs/day: 0.5 packs/day for 20.0 years (10.0 ttl pk-yrs)     Types: Cigarettes     Start date: 1993     Quit date: 2013     Years since quittin.1    Smokeless tobacco: Former     Quit date: 2013   Substance Use Topics    Alcohol use: Yes     Comment: occ     Family History   Problem Relation Age of Onset    Blood Disease Mother         brain damage from encephalitis    Parkinsonism Mother     Diabetes Father         diet controlled    Cardiovascular Father         stent    Parkinsonism Father         demetia form not shaky    Diabetes Brother         juvenille     Diabetes Brother     Diabetes Brother          Current Outpatient Medications   Medication Sig Dispense Refill    acetaminophen (TYLENOL) 325 MG tablet Take 2 tablets (650 mg) by mouth every 4 hours as needed for other (For optimal non-opioid multimodal pain management to improve pain control.)      diphenhydrAMINE (BENADRYL) 25 MG capsule Take 25 mg by mouth every 6 hours as needed for itching or allergies      EPINEPHrine (ANY BX GENERIC EQUIV) 0.3 MG/0.3ML injection 2-pack Inject 0.3 mLs (0.3 mg) into the muscle once as needed for anaphylaxis 0.3 mL 3    ibuprofen (ADVIL/MOTRIN) 600 MG tablet Take 1 tablet (600 mg) by mouth every 6 hours as needed for inflammatory pain      MULTIVITAMINS PO TABS Reported on 2017  3    PARoxetine (PAXIL) 20 MG tablet Take 1 tablet (20 mg) by mouth every morning. ** VISIT DUE/LAST FILL ** 90 tablet 3     Allergies   Allergen Reactions    Bee      Throat closes    Morphine Nausea and Vomiting     Recent Labs   Lab Test  "06/06/24  0503 06/05/24  0417 06/04/24  2255 06/04/24  1031 06/28/23  1611 06/28/23  1611 06/08/21  0918 09/23/20  0703 07/31/17  0850 05/04/17  0855   LDL  --   --   --   --   --   --  198*  --   --   --    HDL  --   --   --   --   --   --  58  --   --   --    TRIG  --   --   --   --   --   --  190*  --   --   --    ALT  --   --   --  37  --   --   --   --   --   --    CR  --   --   --  0.72  --  0.76 0.74 0.79   < >  --    GFRESTIMATED  --   --   --  >90  --  89 88 82   < >  --    GFRESTBLACK  --   --   --   --   --   --  >90 >90   < >  --    POTASSIUM 4.4 4.6   < > 3.2*   < > 3.9 3.7 3.4   < >  --    TSH  --   --   --   --   --   --   --   --   --  1.15    < > = values in this interval not displayed.          Review of Systems  Constitutional, HEENT, cardiovascular, pulmonary, gi and gu systems are negative, except as otherwise noted.     Objective    Exam  /60   Pulse 69   Temp 97.7  F (36.5  C) (Tympanic)   Resp 16   Ht 1.683 m (5' 6.25\")   Wt 66.7 kg (147 lb)   LMP 08/16/2003   SpO2 99%   BMI 23.55 kg/m     Estimated body mass index is 23.55 kg/m  as calculated from the following:    Height as of this encounter: 1.683 m (5' 6.25\").    Weight as of this encounter: 66.7 kg (147 lb).    Physical Exam  GENERAL: alert and no distress  EYES: Eyes grossly normal to inspection, PERRL and conjunctivae and sclerae normal  HENT: ear canals and TM's normal, nose and mouth without ulcers or lesions  NECK: no adenopathy, no asymmetry, masses, or scars  RESP: lungs clear to auscultation - no rales, rhonchi or wheezes  CV: regular rate and rhythm, normal S1 S2, no S3 or S4, no murmur, click or rub, no peripheral edema  ABDOMEN: soft, nontender, no hepatosplenomegaly, no masses and bowel sounds normal  MS: no gross musculoskeletal defects noted, no edema  SKIN: no suspicious lesions or rashes  NEURO: Normal strength and tone, mentation intact and speech normal  PSYCH: mentation appears normal, affect " normal/bright        Signed Electronically by: LIGIA Lee CNP

## 2024-11-06 ENCOUNTER — TELEPHONE (OUTPATIENT)
Dept: FAMILY MEDICINE | Facility: CLINIC | Age: 64
End: 2024-11-06
Payer: COMMERCIAL

## 2024-11-06 NOTE — TELEPHONE ENCOUNTER
Patient Quality Outreach    Patient is due for the following:   Breast Cancer Screening - Mammogram    Next Steps:   mammo    Type of outreach:    Sent [employer] message.      Questions for provider review:    None           Catarina Eli CMA

## 2024-11-06 NOTE — LETTER
November 6, 2024    To  Arti Bui  [id number]  [place] [id number]    Your team at [facility] cares about your health. We have reviewed your chart and based on our findings; we are making the following recommendations to better manage your health.     You are in particular need of attention regarding the following:     Schedule Annual MAMMOGRAPHY. [facility] scheduling number is [phone] or schedule in [employer] (self referral).    If you have already completed these items, please contact the clinic via phone or   [employer] so your care team can review and update your records. Thank you for   choosing [facility] for your healthcare needs. For any questions,   concerns, or to schedule an appointment please contact our clinic.    Healthy Regards,      Your [facility] Care Team

## 2025-08-18 ENCOUNTER — PATIENT OUTREACH (OUTPATIENT)
Dept: CARE COORDINATION | Facility: CLINIC | Age: 65
End: 2025-08-18
Payer: COMMERCIAL

## (undated) DEVICE — DRAIN BLAKE 15FR SIL 2229

## (undated) DEVICE — SOL WATER IRRIG 1000ML BOTTLE 2F7114

## (undated) DEVICE — STPL ENDO RELOAD 45X3.5MM 6R45B

## (undated) DEVICE — SUCTION STRYKERFLOW II 250-070-500

## (undated) DEVICE — SUTURE VICRYL+ 0 27IN CT-1 UND VCP260H

## (undated) DEVICE — ENDO TROCAR SLEEVE KII Z-THREADED 05X100MM CTS02

## (undated) DEVICE — ENDO POUCH UNIV RETRIEVAL SYSTEM INZII 10MM CD001

## (undated) DEVICE — ESU GROUND PAD ADULT REM W/15' CORD E7507DB

## (undated) DEVICE — TUBING SMOKE EVAC PNEUMOCLEAR HIGH FLOW 0620050250

## (undated) DEVICE — ENDO TROCAR FIRST ENTRY KII FIOS Z-THRD 12X100MM CTF73

## (undated) DEVICE — SUTURE VICRYL 0 SH UNDYED J418H

## (undated) DEVICE — NDL INSUFFLATION 13GA 120MM C2201

## (undated) DEVICE — GLOVE PI ULTRATCH M LF SZ 6.5 PF CUFF TEXT STRL LF 42665

## (undated) DEVICE — SUTURE PASSOR W/GUIDE RSG-14F-4-WG

## (undated) DEVICE — DRSG DRAIN 4X4" 7086

## (undated) DEVICE — STPL ENDO LINEAR CUT ARTICULATING 45MM ATS45

## (undated) DEVICE — GOWN LG DISP 9515

## (undated) DEVICE — ESU LIGASURE MARYLAND LAPAROSCOPIC SLR/DVDR 5MMX37CM LF1937

## (undated) DEVICE — CUSTOM PACK LAP CHOLE SBA5BLCHEA

## (undated) DEVICE — ENDO TROCAR FIRST ENTRY KII FIOS Z-THRD 05X100MM CTF03

## (undated) DEVICE — SUCTION MANIFOLD NEPTUNE 2 SYS 1 PORT 702-025-000

## (undated) DEVICE — Device

## (undated) DEVICE — BASIN EMESIS STERILE  SSK9005A

## (undated) DEVICE — PREP CHLORAPREP 26ML TINTED HI-LITE ORANGE 930815

## (undated) DEVICE — DRAIN RESERVOIR 100ML JP 0070740

## (undated) DEVICE — SU MONOCRYL+ 4-0 18IN PS2 UND MCP496G

## (undated) DEVICE — DECANTER VIAL 2006S

## (undated) DEVICE — SOL RINGERS LACTATED 1000ML BAG 2B2324X

## (undated) RX ORDER — EPHEDRINE SULFATE 50 MG/ML
INJECTION, SOLUTION INTRAMUSCULAR; INTRAVENOUS; SUBCUTANEOUS
Status: DISPENSED
Start: 2024-06-04

## (undated) RX ORDER — FENTANYL CITRATE 50 UG/ML
INJECTION, SOLUTION INTRAMUSCULAR; INTRAVENOUS
Status: DISPENSED
Start: 2024-06-04

## (undated) RX ORDER — BUPIVACAINE HYDROCHLORIDE 2.5 MG/ML
INJECTION, SOLUTION EPIDURAL; INFILTRATION; INTRACAUDAL
Status: DISPENSED
Start: 2024-06-04